# Patient Record
Sex: MALE | Race: WHITE | NOT HISPANIC OR LATINO
[De-identification: names, ages, dates, MRNs, and addresses within clinical notes are randomized per-mention and may not be internally consistent; named-entity substitution may affect disease eponyms.]

---

## 2019-02-28 PROBLEM — E78.5 HYPERLIPIDEMIA: Status: ACTIVE | Noted: 2019-02-28

## 2019-02-28 PROBLEM — I10 HYPERTENSION: Status: ACTIVE | Noted: 2019-02-28

## 2019-02-28 RX ORDER — APIXABAN 5 MG/1
5 TABLET, FILM COATED ORAL
Qty: 60 | Refills: 5 | Status: ACTIVE | COMMUNITY

## 2019-03-01 ENCOUNTER — NON-APPOINTMENT (OUTPATIENT)
Age: 74
End: 2019-03-01

## 2019-03-01 ENCOUNTER — APPOINTMENT (OUTPATIENT)
Dept: ELECTROPHYSIOLOGY | Facility: CLINIC | Age: 74
End: 2019-03-01
Payer: MEDICARE

## 2019-03-01 VITALS
DIASTOLIC BLOOD PRESSURE: 93 MMHG | BODY MASS INDEX: 25.13 KG/M2 | OXYGEN SATURATION: 100 % | WEIGHT: 162 LBS | SYSTOLIC BLOOD PRESSURE: 144 MMHG | HEART RATE: 116 BPM | HEIGHT: 67.5 IN

## 2019-03-01 DIAGNOSIS — E78.5 HYPERLIPIDEMIA, UNSPECIFIED: ICD-10-CM

## 2019-03-01 DIAGNOSIS — I10 ESSENTIAL (PRIMARY) HYPERTENSION: ICD-10-CM

## 2019-03-01 PROCEDURE — 99205 OFFICE O/P NEW HI 60 MIN: CPT

## 2019-03-01 PROCEDURE — 93000 ELECTROCARDIOGRAM COMPLETE: CPT

## 2019-03-01 RX ORDER — LATANOPROST/PF 0.005 %
0.01 DROPS OPHTHALMIC (EYE)
Refills: 0 | Status: ACTIVE | COMMUNITY
Start: 2019-03-01

## 2019-03-01 NOTE — PHYSICAL EXAM
[General Appearance - Well Developed] : well developed [Normal Appearance] : normal appearance [Well Groomed] : well groomed [General Appearance - Well Nourished] : well nourished [No Deformities] : no deformities [General Appearance - In No Acute Distress] : no acute distress [Normal Conjunctiva] : the conjunctiva exhibited no abnormalities [Eyelids - No Xanthelasma] : the eyelids demonstrated no xanthelasmas [Normal Oral Mucosa] : normal oral mucosa [No Oral Pallor] : no oral pallor [No Oral Cyanosis] : no oral cyanosis [Normal Jugular Venous A Waves Present] : normal jugular venous A waves present [Normal Jugular Venous V Waves Present] : normal jugular venous V waves present [No Jugular Venous Lopez A Waves] : no jugular venous lopez A waves [Heart Sounds] : normal S1 and S2 [Murmurs] : no murmurs present [Irregularly Irregular] : the rhythm was irregularly irregular [Respiration, Rhythm And Depth] : normal respiratory rhythm and effort [Exaggerated Use Of Accessory Muscles For Inspiration] : no accessory muscle use [Auscultation Breath Sounds / Voice Sounds] : lungs were clear to auscultation bilaterally [Abdomen Soft] : soft [Abdomen Tenderness] : non-tender [Abdomen Mass (___ Cm)] : no abdominal mass palpated [Abnormal Walk] : normal gait [Gait - Sufficient For Exercise Testing] : the gait was sufficient for exercise testing [Nail Clubbing] : no clubbing of the fingernails [Cyanosis, Localized] : no localized cyanosis [Petechial Hemorrhages (___cm)] : no petechial hemorrhages [Skin Color & Pigmentation] : normal skin color and pigmentation [] : no rash [No Venous Stasis] : no venous stasis [Skin Lesions] : no skin lesions [No Skin Ulcers] : no skin ulcer [No Xanthoma] : no  xanthoma was observed [Oriented To Time, Place, And Person] : oriented to person, place, and time [Affect] : the affect was normal [Mood] : the mood was normal [No Anxiety] : not feeling anxious [FreeTextEntry1] : + edema

## 2019-03-01 NOTE — REASON FOR VISIT
[Consultation] : a consultation regarding [Atrial Fibrillation] : atrial fibrillation [Spouse] : spouse [FreeTextEntry1] : atrial flutter

## 2019-03-01 NOTE — DISCUSSION/SUMMARY
[FreeTextEntry1] : 73 year old man with paroxysmal atrial tachyarrhythmias (fibrillation and flutter) in the setting of hypertension.  We discussed options for therapy.  Specifically we discussed the options of rate versus rhythm control.  Although he does function at a high level he may have symptoms, ie decreased exercise tolerance and lethargy, that may be attributed to the arrhythmia.  While amiodarone would be inappropriate long term therapy it may be reasonable to try this medication to prove or disprove this hypothesis.  We also discussed a brief hospitalization for initiation of sotalol in lieu of metoprolol.  We spent a great deal of time discussing the role of ablation.  Should we proceed in this direction, prior to moving catheters in his LA I would get a preprocedure NIKI.  For now we will increase his AV lou blockade, ie add Cardizem.  He will see how he feels with this intervention while he considers the options for rhythm control.  He understands that regardless of the strategy he will need lifelong TE prophylaxis.

## 2019-03-01 NOTE — HISTORY OF PRESENT ILLNESS
[FreeTextEntry1] : 73 year old man with a history of hypertension went for a routine visit to his PMD last month and was found to be in atrial fibrillation.  He does admit to occasional lightheadedness, but no syncope collapse.  No other complaints: no palpitations, no chest pain. He has had intermittent mild shortness of breath, but he has attributed this to deconditioning. He is active.  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  He had been riding about 20 miles for 1.5 hours.  He used to be more aggressive and would even ride in temperatures < 20 degrees.  He does do some calisthenics in the off season. He admits that over the past few years he has been cutting back on his work out regimen.  On finding the arrhythmia in January he was started on Elquis.  He subsequently underwent a stress test, an echo, and an holter.  He remarks that when he showed up for the stress test he was with a resting HR of 145 bpm. and reached 190 bpm during the test.  Of note he did not take the metoprolol that AM. He was surprised that he was only able to exercise for 5 minutes.  HIs wife also notes that he falls asleep much easier and more often.

## 2019-03-03 ENCOUNTER — TRANSCRIPTION ENCOUNTER (OUTPATIENT)
Age: 74
End: 2019-03-03

## 2019-04-04 ENCOUNTER — NON-APPOINTMENT (OUTPATIENT)
Age: 74
End: 2019-04-04

## 2019-04-04 ENCOUNTER — APPOINTMENT (OUTPATIENT)
Dept: ELECTROPHYSIOLOGY | Facility: CLINIC | Age: 74
End: 2019-04-04
Payer: MEDICARE

## 2019-04-04 VITALS
HEART RATE: 67 BPM | SYSTOLIC BLOOD PRESSURE: 139 MMHG | WEIGHT: 162 LBS | OXYGEN SATURATION: 100 % | HEIGHT: 67 IN | BODY MASS INDEX: 25.43 KG/M2 | DIASTOLIC BLOOD PRESSURE: 77 MMHG

## 2019-04-04 PROCEDURE — 99213 OFFICE O/P EST LOW 20 MIN: CPT

## 2019-04-04 PROCEDURE — 93000 ELECTROCARDIOGRAM COMPLETE: CPT

## 2019-04-04 NOTE — DISCUSSION/SUMMARY
[FreeTextEntry1] : Doing well from a symptom standpoint.  We again discussed options.  At this time we will do a 30 d monitor to assess burden and rates of breakthrough.  He understands that regardless of the strategy he will require long term TE prophylaxis, ie oral AC.

## 2019-04-04 NOTE — HISTORY OF PRESENT ILLNESS
[FreeTextEntry1] : He has not had any symptoms for the past month.  That is overall he is feeling well.  He denies palpitations. The sensation of "not feeling right" seems to have gone as well. No chest pain. No shortness of breath.

## 2019-04-04 NOTE — PHYSICAL EXAM
[General Appearance - Well Developed] : well developed [Normal Appearance] : normal appearance [Well Groomed] : well groomed [General Appearance - Well Nourished] : well nourished [No Deformities] : no deformities [General Appearance - In No Acute Distress] : no acute distress [Normal Conjunctiva] : the conjunctiva exhibited no abnormalities [Eyelids - No Xanthelasma] : the eyelids demonstrated no xanthelasmas [Normal Oral Mucosa] : normal oral mucosa [No Oral Pallor] : no oral pallor [No Oral Cyanosis] : no oral cyanosis [Normal Jugular Venous A Waves Present] : normal jugular venous A waves present [Normal Jugular Venous V Waves Present] : normal jugular venous V waves present [No Jugular Venous Lopez A Waves] : no jugular venous lopez A waves [Respiration, Rhythm And Depth] : normal respiratory rhythm and effort [Exaggerated Use Of Accessory Muscles For Inspiration] : no accessory muscle use [Auscultation Breath Sounds / Voice Sounds] : lungs were clear to auscultation bilaterally [Heart Sounds] : normal S1 and S2 [Murmurs] : no murmurs present [Abdomen Soft] : soft [Abdomen Tenderness] : non-tender [Abdomen Mass (___ Cm)] : no abdominal mass palpated [Abnormal Walk] : normal gait [Gait - Sufficient For Exercise Testing] : the gait was sufficient for exercise testing [Nail Clubbing] : no clubbing of the fingernails [Cyanosis, Localized] : no localized cyanosis [Petechial Hemorrhages (___cm)] : no petechial hemorrhages [Skin Color & Pigmentation] : normal skin color and pigmentation [] : no rash [No Venous Stasis] : no venous stasis [Skin Lesions] : no skin lesions [No Skin Ulcers] : no skin ulcer [No Xanthoma] : no  xanthoma was observed [Oriented To Time, Place, And Person] : oriented to person, place, and time [Affect] : the affect was normal [Mood] : the mood was normal [No Anxiety] : not feeling anxious [Normal] : the heart rate was normal [Regular] : the rhythm was regular [FreeTextEntry1] : + edema

## 2019-05-19 ENCOUNTER — FORM ENCOUNTER (OUTPATIENT)
Age: 74
End: 2019-05-19

## 2019-06-09 ENCOUNTER — TRANSCRIPTION ENCOUNTER (OUTPATIENT)
Age: 74
End: 2019-06-09

## 2019-08-27 ENCOUNTER — OUTPATIENT (OUTPATIENT)
Dept: OUTPATIENT SERVICES | Facility: HOSPITAL | Age: 74
LOS: 1 days | End: 2019-08-27
Payer: MEDICARE

## 2019-08-27 ENCOUNTER — APPOINTMENT (OUTPATIENT)
Dept: CV DIAGNOSITCS | Facility: HOSPITAL | Age: 74
End: 2019-08-27

## 2019-08-27 VITALS
WEIGHT: 160.06 LBS | TEMPERATURE: 98 F | RESPIRATION RATE: 18 BRPM | OXYGEN SATURATION: 98 % | SYSTOLIC BLOOD PRESSURE: 130 MMHG | HEART RATE: 112 BPM | DIASTOLIC BLOOD PRESSURE: 90 MMHG | HEIGHT: 67 IN

## 2019-08-27 DIAGNOSIS — I48.91 UNSPECIFIED ATRIAL FIBRILLATION: ICD-10-CM

## 2019-08-27 DIAGNOSIS — Z01.818 ENCOUNTER FOR OTHER PREPROCEDURAL EXAMINATION: ICD-10-CM

## 2019-08-27 DIAGNOSIS — S83.289A OTHER TEAR OF LATERAL MENISCUS, CURRENT INJURY, UNSPECIFIED KNEE, INITIAL ENCOUNTER: Chronic | ICD-10-CM

## 2019-08-27 LAB
ANION GAP SERPL CALC-SCNC: 15 MMOL/L — SIGNIFICANT CHANGE UP (ref 5–17)
APTT BLD: 32.3 SEC — SIGNIFICANT CHANGE UP (ref 27.5–36.3)
BLD GP AB SCN SERPL QL: NEGATIVE — SIGNIFICANT CHANGE UP
BUN SERPL-MCNC: 14 MG/DL — SIGNIFICANT CHANGE UP (ref 7–23)
CALCIUM SERPL-MCNC: 10.2 MG/DL — SIGNIFICANT CHANGE UP (ref 8.4–10.5)
CHLORIDE SERPL-SCNC: 100 MMOL/L — SIGNIFICANT CHANGE UP (ref 96–108)
CO2 SERPL-SCNC: 24 MMOL/L — SIGNIFICANT CHANGE UP (ref 22–31)
CREAT SERPL-MCNC: 0.92 MG/DL — SIGNIFICANT CHANGE UP (ref 0.5–1.3)
GLUCOSE SERPL-MCNC: 112 MG/DL — HIGH (ref 70–99)
HCT VFR BLD CALC: 43.7 % — SIGNIFICANT CHANGE UP (ref 39–50)
HGB BLD-MCNC: 14.4 G/DL — SIGNIFICANT CHANGE UP (ref 13–17)
INR BLD: 1.19 RATIO — HIGH (ref 0.88–1.16)
MCHC RBC-ENTMCNC: 33 GM/DL — SIGNIFICANT CHANGE UP (ref 32–36)
MCHC RBC-ENTMCNC: 33 PG — SIGNIFICANT CHANGE UP (ref 27–34)
MCV RBC AUTO: 100 FL — SIGNIFICANT CHANGE UP (ref 80–100)
PLATELET # BLD AUTO: 165 K/UL — SIGNIFICANT CHANGE UP (ref 150–400)
POTASSIUM SERPL-MCNC: 4.3 MMOL/L — SIGNIFICANT CHANGE UP (ref 3.5–5.3)
POTASSIUM SERPL-SCNC: 4.3 MMOL/L — SIGNIFICANT CHANGE UP (ref 3.5–5.3)
PROTHROM AB SERPL-ACNC: 13.6 SEC — HIGH (ref 10–12.9)
RBC # BLD: 4.37 M/UL — SIGNIFICANT CHANGE UP (ref 4.2–5.8)
RBC # FLD: 12.6 % — SIGNIFICANT CHANGE UP (ref 10.3–14.5)
RH IG SCN BLD-IMP: POSITIVE — SIGNIFICANT CHANGE UP
SODIUM SERPL-SCNC: 139 MMOL/L — SIGNIFICANT CHANGE UP (ref 135–145)
WBC # BLD: 4 K/UL — SIGNIFICANT CHANGE UP (ref 3.8–10.5)
WBC # FLD AUTO: 4 K/UL — SIGNIFICANT CHANGE UP (ref 3.8–10.5)

## 2019-08-27 PROCEDURE — 93312 ECHO TRANSESOPHAGEAL: CPT | Mod: 26

## 2019-08-27 PROCEDURE — 93010 ELECTROCARDIOGRAM REPORT: CPT

## 2019-08-27 PROCEDURE — 93306 TTE W/DOPPLER COMPLETE: CPT | Mod: 26

## 2019-08-27 RX ORDER — APIXABAN 2.5 MG/1
1 TABLET, FILM COATED ORAL
Qty: 0 | Refills: 0 | DISCHARGE

## 2019-08-27 NOTE — H&P CARDIOLOGY - HISTORY OF PRESENT ILLNESS
This is a 74 yo male [facility] HTN, former smoker who presented to [facility] in January for routine examination and was found to be in afib and was started on Eliquis last dose 8/27/19.  Pt. endorses episodes of lightheadedness and SOB. 2/6/19 Holter monitor revealed persistent atrial fib/flutter.  Denies chest pain/pressure, dizziness, diaphoresis, nausea, vomiting, peripheral edema, recent weight gain, or syncope.  No implanted monitoring devices.  Pt. presents for PST/NIKI to proceed with ablation 8/28/19.

## 2019-08-28 ENCOUNTER — OUTPATIENT (OUTPATIENT)
Dept: INPATIENT UNIT | Facility: HOSPITAL | Age: 74
LOS: 1 days | End: 2019-08-28
Payer: MEDICARE

## 2019-08-28 VITALS
RESPIRATION RATE: 17 BRPM | SYSTOLIC BLOOD PRESSURE: 120 MMHG | DIASTOLIC BLOOD PRESSURE: 80 MMHG | WEIGHT: 160.06 LBS | OXYGEN SATURATION: 97 % | HEIGHT: 68 IN | HEART RATE: 94 BPM | TEMPERATURE: 98 F

## 2019-08-28 DIAGNOSIS — I48.91 UNSPECIFIED ATRIAL FIBRILLATION: ICD-10-CM

## 2019-08-28 DIAGNOSIS — S83.289A OTHER TEAR OF LATERAL MENISCUS, CURRENT INJURY, UNSPECIFIED KNEE, INITIAL ENCOUNTER: Chronic | ICD-10-CM

## 2019-08-28 LAB
BLD GP AB SCN SERPL QL: NEGATIVE — SIGNIFICANT CHANGE UP
RH IG SCN BLD-IMP: POSITIVE — SIGNIFICANT CHANGE UP

## 2019-08-28 RX ORDER — PANTOPRAZOLE SODIUM 20 MG/1
40 TABLET, DELAYED RELEASE ORAL DAILY
Refills: 0 | Status: DISCONTINUED | OUTPATIENT
Start: 2019-08-28 | End: 2019-08-30

## 2019-08-28 RX ORDER — METOPROLOL TARTRATE 50 MG
100 TABLET ORAL DAILY
Refills: 0 | Status: DISCONTINUED | OUTPATIENT
Start: 2019-08-28 | End: 2019-08-29

## 2019-08-28 RX ORDER — DILTIAZEM HCL 120 MG
180 CAPSULE, EXT RELEASE 24 HR ORAL DAILY
Refills: 0 | Status: DISCONTINUED | OUTPATIENT
Start: 2019-08-28 | End: 2019-08-29

## 2019-08-28 RX ORDER — APIXABAN 2.5 MG/1
5 TABLET, FILM COATED ORAL
Refills: 0 | Status: DISCONTINUED | OUTPATIENT
Start: 2019-08-28 | End: 2019-08-30

## 2019-08-28 RX ADMIN — APIXABAN 5 MILLIGRAM(S): 2.5 TABLET, FILM COATED ORAL at 14:36

## 2019-08-28 RX ADMIN — PANTOPRAZOLE SODIUM 40 MILLIGRAM(S): 20 TABLET, DELAYED RELEASE ORAL at 14:36

## 2019-08-28 NOTE — CHART NOTE - NSCHARTNOTEFT_GEN_A_CORE
Removal of Femoral Suture    Pulses in the right lower extremity are palpable.   The patient was placed in the supine position. The insertion site was identified and the sutures were removed per protocol.    The suture with stopcock removed.   Direct pressure was applied for  __10___ minutes.   Complications: None, VSS, Good Hemostasis.     Monitoring of the right groin and lower extremity including neuro-vascular checks and vital signs every 15 minutes x 4, then every 30 minutes x 2, then every 1 hour was ordered.    Discharge Instruction discussed with patient: ASA, Eliquis, diet, activities, access site care, follow up care, reportable signs and symptoms.     A/P   74 yo male [facility] HTN, former smoker who presented to PMD in January for routine examination and was found to be in afib and was started on Eliquis last dose 8/27/19.  Pt. endorses episodes of lightheadedness and SOB. 2/6/19 Holter monitor revealed persistent atrial fib/flutter.  Denies chest pain/pressure, dizziness, diaphoresis, nausea, vomiting, peripheral edema, recent weight gain, or syncope.  No implanted monitoring devices.  Pt s/p AF/AFL ablation via RFV with no site complications    Plan: continue to monitor, Continue Eliquis received dose at 2pm will receive at [facility] then Cont 10a&10P tomorrow  Cont Protonix for 1 month  Cont Soft mech diet for 1 month   discharge home in am if stable.  Pt will follow up with Dr. Davidson as scheduled and your cardiologist in 1-2weeks.    Brunilda Dawson, [facility]  Cardiology

## 2019-08-28 NOTE — PROGRESS NOTE ADULT - SUBJECTIVE AND OBJECTIVE BOX
Pre-op Diagnosis: PAF, AFL    Post-op Diagnosis: same    Procedure: EPS/Ablation    Electrophysiologist: Stuart    Anesthesia: Dr. Santiago    Access: RFV (sonosite guided)    Description:  The patient presented to the EP laboratory in atrial flutter.  Surface F wave and intracardiac activation were consistent with typical counter clockwise atrial flutter and entrainment mappng confirmed the rhythm to be dependant on the CTI.  A linear series of lesions was placed from the TV to the IVC.  Fluter CL increased and the rhythm terminated at the isthmus with RF.  Rate independent bidirectional block was achieved.  With the aid of intracardiac echo and fluoroscopy transeptal puncture was performed (mean LA= 15 mmHg). A 3D electroanatomic shell of the LA was created with Carto 3.  Empiric isolation was performed in sinus rhythm. First pass isolation was achieved on both sides.  There was automaticity of both left and right veins post isolation.  Adenosine failed to promote reconnection. Voltage mapping showed normal extravenous voltage. PVs remained isolated through a > 30 min waiting period and CTI block was present for > 1 hour.      Complications: none    EBL: < 50 cc    Disposition: CSSU     Plan: Continue with Eliquis (today at 2 pm)          Remove suture at 6 pm

## 2019-08-28 NOTE — CHART NOTE - NSCHARTNOTEFT_GEN_A_CORE
Patient s/p AFib/Flutter ablation, will receive Eliquis at 1400 and midnight tonight and resume 10a and 10p on 8/29, discussed with Dr Davidson.    Elissa Bentley, NP  [id number]

## 2019-08-29 ENCOUNTER — TRANSCRIPTION ENCOUNTER (OUTPATIENT)
Age: 74
End: 2019-08-29

## 2019-08-29 LAB
ANION GAP SERPL CALC-SCNC: 11 MMOL/L — SIGNIFICANT CHANGE UP (ref 5–17)
BUN SERPL-MCNC: 15 MG/DL — SIGNIFICANT CHANGE UP (ref 7–23)
CALCIUM SERPL-MCNC: 9.4 MG/DL — SIGNIFICANT CHANGE UP (ref 8.4–10.5)
CHLORIDE SERPL-SCNC: 106 MMOL/L — SIGNIFICANT CHANGE UP (ref 96–108)
CO2 SERPL-SCNC: 23 MMOL/L — SIGNIFICANT CHANGE UP (ref 22–31)
CREAT SERPL-MCNC: 0.92 MG/DL — SIGNIFICANT CHANGE UP (ref 0.5–1.3)
GLUCOSE SERPL-MCNC: 155 MG/DL — HIGH (ref 70–99)
HCT VFR BLD CALC: 39.9 % — SIGNIFICANT CHANGE UP (ref 39–50)
HGB BLD-MCNC: 13.6 G/DL — SIGNIFICANT CHANGE UP (ref 13–17)
MAGNESIUM SERPL-MCNC: 2.1 MG/DL — SIGNIFICANT CHANGE UP (ref 1.6–2.6)
MCHC RBC-ENTMCNC: 33.9 PG — SIGNIFICANT CHANGE UP (ref 27–34)
MCHC RBC-ENTMCNC: 34 GM/DL — SIGNIFICANT CHANGE UP (ref 32–36)
MCV RBC AUTO: 99.8 FL — SIGNIFICANT CHANGE UP (ref 80–100)
PLATELET # BLD AUTO: 153 K/UL — SIGNIFICANT CHANGE UP (ref 150–400)
POTASSIUM SERPL-MCNC: 4.7 MMOL/L — SIGNIFICANT CHANGE UP (ref 3.5–5.3)
POTASSIUM SERPL-SCNC: 4.7 MMOL/L — SIGNIFICANT CHANGE UP (ref 3.5–5.3)
RBC # BLD: 4 M/UL — LOW (ref 4.2–5.8)
RBC # FLD: 13 % — SIGNIFICANT CHANGE UP (ref 10.3–14.5)
SODIUM SERPL-SCNC: 140 MMOL/L — SIGNIFICANT CHANGE UP (ref 135–145)
WBC # BLD: 4.9 K/UL — SIGNIFICANT CHANGE UP (ref 3.8–10.5)
WBC # FLD AUTO: 4.9 K/UL — SIGNIFICANT CHANGE UP (ref 3.8–10.5)

## 2019-08-29 PROCEDURE — 93010 ELECTROCARDIOGRAM REPORT: CPT

## 2019-08-29 PROCEDURE — 93010 ELECTROCARDIOGRAM REPORT: CPT | Mod: 76

## 2019-08-29 RX ORDER — SOTALOL HCL 120 MG
80 TABLET ORAL EVERY 12 HOURS
Refills: 0 | Status: DISCONTINUED | OUTPATIENT
Start: 2019-08-29 | End: 2019-08-29

## 2019-08-29 RX ORDER — SODIUM CHLORIDE 9 MG/ML
250 INJECTION INTRAMUSCULAR; INTRAVENOUS; SUBCUTANEOUS ONCE
Refills: 0 | Status: DISCONTINUED | OUTPATIENT
Start: 2019-08-29 | End: 2019-08-29

## 2019-08-29 RX ORDER — PANTOPRAZOLE SODIUM 20 MG/1
1 TABLET, DELAYED RELEASE ORAL
Qty: 30 | Refills: 0
Start: 2019-08-29 | End: 2019-09-27

## 2019-08-29 RX ORDER — SOTALOL HCL 120 MG
80 TABLET ORAL ONCE
Refills: 0 | Status: COMPLETED | OUTPATIENT
Start: 2019-08-29 | End: 2019-08-29

## 2019-08-29 RX ORDER — SOTALOL HCL 120 MG
80 TABLET ORAL EVERY 12 HOURS
Refills: 0 | Status: DISCONTINUED | OUTPATIENT
Start: 2019-08-29 | End: 2019-08-30

## 2019-08-29 RX ADMIN — APIXABAN 5 MILLIGRAM(S): 2.5 TABLET, FILM COATED ORAL at 10:31

## 2019-08-29 RX ADMIN — Medication 80 MILLIGRAM(S): at 09:20

## 2019-08-29 RX ADMIN — Medication 180 MILLIGRAM(S): at 05:35

## 2019-08-29 RX ADMIN — APIXABAN 5 MILLIGRAM(S): 2.5 TABLET, FILM COATED ORAL at 21:20

## 2019-08-29 RX ADMIN — APIXABAN 5 MILLIGRAM(S): 2.5 TABLET, FILM COATED ORAL at 00:36

## 2019-08-29 RX ADMIN — PANTOPRAZOLE SODIUM 40 MILLIGRAM(S): 20 TABLET, DELAYED RELEASE ORAL at 05:35

## 2019-08-29 RX ADMIN — Medication 80 MILLIGRAM(S): at 21:20

## 2019-08-29 RX ADMIN — Medication 100 MILLIGRAM(S): at 05:35

## 2019-08-29 NOTE — DISCHARGE NOTE PROVIDER - NSDCFUADDINST_GEN_ALL_CORE_FT
No heavy lifting, strenuous activity, bending, straining, or unnecessary stair climbing for 2 weeks. No driving for 2 days. You may shower 24 hours following the procedure but avoid baths/swimming for 1 week. Check your groin site for bleeding and/or swelling daily following procedure and call your doctor immediately if it occurs or if you experience increased pain at the site. Follow up with your cardiologist in 1-2 weeks. You may call [facility]  if you have any questions/concerns regarding your procedure [phone]. No heavy lifting, strenuous activity, bending, straining, or unnecessary stair climbing for 2 weeks. No driving for 2 days. You may shower 24 hours following the procedure but avoid baths/swimming for 1 week. Check your groin site for bleeding and/or swelling daily following procedure and call your doctor immediately if it occurs or if you experience increased pain at the site. Follow up with your cardiologist in 1-2 weeks. You may call [facility]  if you have any questions/concerns regarding your procedure [phone].  Continue taking Sotalol as prescribed.

## 2019-08-29 NOTE — DISCHARGE NOTE PROVIDER - HOSPITAL COURSE
This is a 74 yo male [facility] HTN, former smoker who presented to [facility] in January for routine examination and was found to be in afib and was started on Eliquis last dose 8/27/19.  Pt. endorses episodes of lightheadedness and SOB. 2/6/19 Holter monitor revealed persistent atrial fib/flutter.  Denies chest pain/pressure, dizziness, diaphoresis, nausea, vomiting, peripheral edema, recent weight gain, or syncope.  No implanted monitoring devices.         Pt s/p AF/AFL ablation via RFV with no site complications or tele events.  Pt remains in NSR 80-90s overnight.  Pt tolerated procedure well with no post ablation complications and hospitalization remained uneventful. Pt stable for discharge home activity and restrictions, diet, medication and diet reviewed with pt and verbalized good understanding via teachback method. This is a 74 yo male [facility] HTN, former smoker who presented to [facility] in January for routine examination and was found to be in afib and was started on Eliquis last dose 8/27/19.  Pt. endorses episodes of lightheadedness and SOB. 2/6/19 Holter monitor revealed persistent atrial fib/flutter.  Denies chest pain/pressure, dizziness, diaphoresis, nausea, vomiting, peripheral edema, recent weight gain, or syncope.  No implanted monitoring devices.         Pt s/p AF/AFL ablation via RFV with no site complications or tele events.  Pt remains in NSR 80-90s overnight.  Pt tolerated procedure well with no post ablation complications and hospitalization remained uneventful. Pt stable for discharge home activity and restrictions, diet, medication and diet reviewed with pt and verbalized good understanding via teachback method.          8/29 Started on Sotalol 80 mg PO Q12h, QTc 466 ms.

## 2019-08-29 NOTE — DISCHARGE NOTE PROVIDER - NSDCPNSUBOBJ_GEN_ALL_CORE
Patient is a 73y old  Male who presents with a chief complaint of Atrial Fibrillation/Atrial Flutter (29 Aug 2019 07:44)                    Allergies        No Known Allergies        Intolerances                Medications:    apixaban 5 milliGRAM(s) Oral two times a day    pantoprazole    Tablet 40 milliGRAM(s) Oral daily    sotalol 80 milliGRAM(s) Oral every 12 hours            Vitals:    T(C): 36.5 (08-30-19 @ 12:10), Max: 36.6 (08-29-19 @ 20:26)    HR: 78 (08-30-19 @ 12:10) (72 - 78)    BP: 120/77 (08-30-19 @ 12:10) (115/86 - 120/77)    BP(mean): --    RR: 17 (08-30-19 @ 12:10) (16 - 17)    SpO2: 96% (08-30-19 @ 12:10) (95% - 96%)    Wt(kg): --    Daily       Daily     I&O's Summary        29 Aug 2019 07:01  -  30 Aug 2019 07:00    --------------------------------------------------------    IN: 720 mL / OUT: 300 mL / NET: 420 mL        30 Aug 2019 07:01  -  30 Aug 2019 19:19    --------------------------------------------------------    IN: 420 mL / OUT: 0 mL / NET: 420 mL                    Physical Exam:    Appearance: Normal    Eyes: PERRL, EOMI    HENT: Normal oral muscosa, NC/AT    Cardiovascular: S1S2, RRR, No M/R/G, no JVD, No Lower extremity edema    Procedural Access Site: No hematoma, Non-tender to palpation, 2+ pulse, No bruit, No Ecchymosis    Respiratory: Clear to auscultation bilaterally    Gastrointestinal: Soft, Non tender, Normal Bowel Sounds    Musculoskeletal: No clubbing, No joint deformity     Neurologic: Non-focal    Lymphatic: No lymphadenopathy    Psychiatry: AAOx3, Mood & affect appropriate    Skin: No rashes, No ecchymoses, No cyanosis        08-30        137  |  102  |  20    ----------------------------<  123<H>    4.4   |  25  |  1.00        Ca    9.2      30 Aug 2019 00:18    Mg     2.1     08-29                            Lipid panel     Hgb A1c                             12.0     6.4   )-----------( 136      ( 30 Aug 2019 00:18 )               35.2                 ECG: SR 86 bpm        Electrophysiology: Aflutter ablation        Imaging:        Interpretation of Telemetry:            ATRIAL FLUTTER ABLATION    Continue Sotalol as prescribed    Monitor groin site for swelling, bleeding

## 2019-08-29 NOTE — CHART NOTE - NSCHARTNOTEFT_GEN_A_CORE
Call from Tele: Pt converted to Atrial fibrillation    Pt s/p AFIB/AFL ablation on 8/28/19 was in the process of being d/c home when arrhythmia was discovered.  Pt asymptomatic.    Plan:  Sotalol 80mg x1 given with repeat ECG done with QTc acceptable parameters.  R/W Dr. Davidson  Continue Sotalol 80mg BID ECG 2 hours post to check QTc   D/C Metoprolol  Continue Eliquis  NPO after Midnight  Dr. Davidson and myself discussed plan with pt  Cont tele    LONNY Bowen-BC  Cardiology

## 2019-08-29 NOTE — DISCHARGE NOTE PROVIDER - NSDCACTIVITY_GEN_ALL_CORE
Walking - Indoors allowed/Walking - Outdoors allowed/Showering allowed Walking - Indoors allowed/Walking - Outdoors allowed/No heavy lifting/straining/Showering allowed

## 2019-08-29 NOTE — CHART NOTE - NSCHARTNOTEFT_GEN_A_CORE
Dizziness when ambulating.  Tele HR high 50s-70s with 2.1s pause AF    Plan:  Maintain bedrest  followup orthostatic BP/HR  continue Sotalol  cont to hold BB and CCB will reevaluate in am  cont tele  Stat Echo to r/o Pericardial Effusion  Cont Eliquis  NPO after MN  Discussed plan with Dr. Stuart Dawson, [facility]  Cardiology Dizziness when ambulating.  Tele HR high 50s-70s with 2.1s pause AF    Plan:  Maintain bedrest  followup orthostatic BP/HR  continue Sotalol  cont to hold BB and CCB will reevaluate in am  cont tele  Stat Echo to r/o Pericardial Effusion  Cont Eliquis  NPO after MN  Discussed plan with Dr. Stuart Dawson, [facility]  Cardiology    Addendum:  Preliminary Echo results negative for pericardial effusion  Negative orthostatic BP/HR BP soft 95/79  ECG Afib @66  Will give NS bolus 250cc over 20min    Brunilda Dawson, [facility]  Cardiology Dizziness when ambulating.  Tele HR high 50s-70s with 2.1s pause AF    Plan:  Maintain bedrest  followup orthostatic BP/HR  continue Sotalol  cont to hold BB and CCB will reevaluate in am  cont tele  Stat Echo to r/o Pericardial Effusion  Cont Eliquis  NPO after MN  Discussed plan with Dr. Stuart Dawson, [facility]  Cardiology    Addendum:  Preliminary Echo results negative for pericardial effusion  Negative orthostatic BP/HR BP soft 95/79  ECG Afib @66    Brunilda Dawson [facility]  Cardiology

## 2019-08-29 NOTE — DISCHARGE NOTE PROVIDER - NSDCFUADDAPPT_GEN_ALL_CORE_FT
Follow up with Dr. Davidson on 10/10/19 at 8:20am Follow up with Dr. Davidson on Thursday, October 10th at 8:20 am.

## 2019-08-29 NOTE — DISCHARGE NOTE PROVIDER - NSDCFUSCHEDAPPT_GEN_ALL_CORE_FT
YOLANDA ASCENCIO ; 10/10/2019 ; [facility]  YOLNADA ASCENCIO ; 10/10/2019 ; [facility]

## 2019-08-29 NOTE — CHART NOTE - NSCHARTNOTEFT_GEN_A_CORE
Tele Event:    Pt had 2.05s conversion pause at 1647 and NSR at 1655 60s and SBP 120s    Pt states feeling well    Plan as d/w Dr. Davidson  Cont tele monitoring overnight  ECG NSR @67 QTc 400  Cont Sotalol f/u with ECG to check QTc 2 hours post dose  Cont NPO for now  No DCCV if pt remains in SR  D/W pt and family the plan    Brunilda Dawson, JIMBOCNP-BC  Cardiology

## 2019-08-29 NOTE — CHART NOTE - NSCHARTNOTEFT_GEN_A_CORE
Patient is a 73y old male currently resting comfortably, offers no complaints.         Allergies    No Known Allergies    Intolerances        Medications:  apixaban 5 milliGRAM(s) Oral two times a day  diltiazem    milliGRAM(s) Oral daily  metoprolol succinate  milliGRAM(s) Oral daily  pantoprazole    Tablet 40 milliGRAM(s) Oral daily      Vitals:  T(C): 37.1 (19 @ 20:40), Max: 37.1 (19 @ 20:40)  HR: 93 (19 @ 21:40) (93 - 99)  BP: 129/88 (19 @ 21:40) (96/76 - 133/91)  BP(mean): --  RR: 16 (19 @ 21:40) (16 - 17)  SpO2: 98% (19 @ 21:40) (96% - 100%)  Wt(kg): --  Daily Height in cm: 172.72 (28 Aug 2019 13:25)    Daily Weight in k.6 (28 Aug 2019 13:25)  I&O's Summary    28 Aug 2019 07:01  -  29 Aug 2019 00:59  --------------------------------------------------------  IN: 180 mL / OUT: 500 mL / NET: -320 mL          Physical Exam:  Appearance: Normal  Eyes: PERRL, EOMI  HENT: Normal oral muscosa, NC/AT  Cardiovascular: S1S2, RRR, No M/R/G, no JVD, No Lower extremity edema  Procedural Access Site RIGHT FEMORAL VEIN: No hematoma, Non-tender to palpation, 2+ pulse, No bruit, No Ecchymosis  Respiratory: Clear to auscultation bilaterally  Gastrointestinal: Soft, Non tender, Normal Bowel Sounds  Musculoskeletal: No clubbing, No joint deformity   Neurologic: Non-focal  Lymphatic: No lymphadenopathy  Psychiatry: AAOx3, Mood & affect appropriate  Skin: No rashes, No ecchymoses, No cyanosis        139  |  100  |  14  ----------------------------<  112<H>  4.3   |  24  |  0.92    Ca    10.2      27 Aug 2019 09:22      PT/INR - ( 27 Aug 2019 09:22 )   PT: 13.6 sec;   INR: 1.19 ratio         PTT - ( 27 Aug 2019 09:22 )  PTT:32.3 sec    19 PROCEDURE: Atrial flutter ablation/atrial fibrillation      ECG post procedure: SR with APC's      Interpretation of Telemetry: 8 beats of WCT @ 2015 on     A/P  This is a 72 yo male [facility] HTN, former smoker who presented to PMD in January for routine examination and was found to be in afib and was started on Eliquis last dose 19.  Pt. endorses episodes of lightheadedness and SOB. 19 Holter monitor revealed persistent atrial fib/flutter. On  pt underwent successful atrial fib/flutter ablation via right femoral artery access. Pt currently pain free and hemodynamically stable.     #atrial fib/flutter  Eliquis resumed post procedure  continue CCB and beta blocker  PPI for GI/esophageal prophylaxis  monitor groin site    #HTN  continue Cardizem and Metoprolol    #DISPO  anticipate discharge later this am if site/condition remains stable  follow up appointment with Oct 10, 2019 8:20am

## 2019-08-29 NOTE — DISCHARGE NOTE PROVIDER - CARE PROVIDER_API CALL
Jacob Davidson)  Cardiac Electrophysiology; Cardiology  [address] [place] [address]  Phone: [phone]  Fax: [phone]  Follow Up Time: 1 month

## 2019-08-29 NOTE — DISCHARGE NOTE PROVIDER - NSDCCPCAREPLAN_GEN_ALL_CORE_FT
PRINCIPAL DISCHARGE DIAGNOSIS  Diagnosis: Atrial fibrillation and flutter  Assessment and Plan of Treatment: s/p Ablation.  No heavy lifting, strenuous activity, bending, straining, or unnecessary stair climbing for 2 weeks. No driving for 2 days. You may shower 24 hours following the procedure but avoid baths/swimming and sexual activity for 1 week. Check your groin site for bleeding and/or swelling daily following procedure and call your doctor immediately if it occurs or if you experience increased pain at the site. Follow up with your cardiologist in 1-2 weeks. You may call [facility] if you have any questions/concerns regarding your procedure [phone].

## 2019-08-29 NOTE — DISCHARGE NOTE PROVIDER - NSDCCPTREATMENT_GEN_ALL_CORE_FT
PRINCIPAL PROCEDURE  Procedure: Cardiac electrophysiology study with ablation of focus for atrial fibrillation  Findings and Treatment: Atrial fibrillation and Atrial flutter ablation via Right Femoral Vein

## 2019-08-30 ENCOUNTER — TRANSCRIPTION ENCOUNTER (OUTPATIENT)
Age: 74
End: 2019-08-30

## 2019-08-30 VITALS
DIASTOLIC BLOOD PRESSURE: 83 MMHG | TEMPERATURE: 98 F | SYSTOLIC BLOOD PRESSURE: 132 MMHG | HEART RATE: 82 BPM | RESPIRATION RATE: 18 BRPM | OXYGEN SATURATION: 97 %

## 2019-08-30 DIAGNOSIS — I48.91 UNSPECIFIED ATRIAL FIBRILLATION: ICD-10-CM

## 2019-08-30 DIAGNOSIS — I10 ESSENTIAL (PRIMARY) HYPERTENSION: ICD-10-CM

## 2019-08-30 LAB
ANION GAP SERPL CALC-SCNC: 10 MMOL/L — SIGNIFICANT CHANGE UP (ref 5–17)
BASOPHILS # BLD AUTO: 0 K/UL — SIGNIFICANT CHANGE UP (ref 0–0.2)
BASOPHILS NFR BLD AUTO: 0.4 % — SIGNIFICANT CHANGE UP (ref 0–2)
BUN SERPL-MCNC: 20 MG/DL — SIGNIFICANT CHANGE UP (ref 7–23)
CALCIUM SERPL-MCNC: 9.2 MG/DL — SIGNIFICANT CHANGE UP (ref 8.4–10.5)
CHLORIDE SERPL-SCNC: 102 MMOL/L — SIGNIFICANT CHANGE UP (ref 96–108)
CO2 SERPL-SCNC: 25 MMOL/L — SIGNIFICANT CHANGE UP (ref 22–31)
CREAT SERPL-MCNC: 1 MG/DL — SIGNIFICANT CHANGE UP (ref 0.5–1.3)
EOSINOPHIL # BLD AUTO: 0.1 K/UL — SIGNIFICANT CHANGE UP (ref 0–0.5)
EOSINOPHIL NFR BLD AUTO: 1.1 % — SIGNIFICANT CHANGE UP (ref 0–6)
GLUCOSE SERPL-MCNC: 123 MG/DL — HIGH (ref 70–99)
HCT VFR BLD CALC: 35.2 % — LOW (ref 39–50)
HGB BLD-MCNC: 12 G/DL — LOW (ref 13–17)
LYMPHOCYTES # BLD AUTO: 1.1 K/UL — SIGNIFICANT CHANGE UP (ref 1–3.3)
LYMPHOCYTES # BLD AUTO: 16.6 % — SIGNIFICANT CHANGE UP (ref 13–44)
MCHC RBC-ENTMCNC: 34 PG — SIGNIFICANT CHANGE UP (ref 27–34)
MCHC RBC-ENTMCNC: 34.1 GM/DL — SIGNIFICANT CHANGE UP (ref 32–36)
MCV RBC AUTO: 99.5 FL — SIGNIFICANT CHANGE UP (ref 80–100)
MONOCYTES # BLD AUTO: 0.5 K/UL — SIGNIFICANT CHANGE UP (ref 0–0.9)
MONOCYTES NFR BLD AUTO: 7.4 % — SIGNIFICANT CHANGE UP (ref 2–14)
NEUTROPHILS # BLD AUTO: 4.8 K/UL — SIGNIFICANT CHANGE UP (ref 1.8–7.4)
NEUTROPHILS NFR BLD AUTO: 74.5 % — SIGNIFICANT CHANGE UP (ref 43–77)
PLATELET # BLD AUTO: 136 K/UL — LOW (ref 150–400)
POTASSIUM SERPL-MCNC: 4.4 MMOL/L — SIGNIFICANT CHANGE UP (ref 3.5–5.3)
POTASSIUM SERPL-SCNC: 4.4 MMOL/L — SIGNIFICANT CHANGE UP (ref 3.5–5.3)
RBC # BLD: 3.54 M/UL — LOW (ref 4.2–5.8)
RBC # FLD: 12.8 % — SIGNIFICANT CHANGE UP (ref 10.3–14.5)
SODIUM SERPL-SCNC: 137 MMOL/L — SIGNIFICANT CHANGE UP (ref 135–145)
WBC # BLD: 6.4 K/UL — SIGNIFICANT CHANGE UP (ref 3.8–10.5)
WBC # FLD AUTO: 6.4 K/UL — SIGNIFICANT CHANGE UP (ref 3.8–10.5)

## 2019-08-30 PROCEDURE — 86900 BLOOD TYPING SEROLOGIC ABO: CPT

## 2019-08-30 PROCEDURE — 93005 ELECTROCARDIOGRAM TRACING: CPT

## 2019-08-30 PROCEDURE — 85610 PROTHROMBIN TIME: CPT

## 2019-08-30 PROCEDURE — 93010 ELECTROCARDIOGRAM REPORT: CPT

## 2019-08-30 PROCEDURE — 93306 TTE W/DOPPLER COMPLETE: CPT

## 2019-08-30 PROCEDURE — G0463: CPT

## 2019-08-30 PROCEDURE — 85730 THROMBOPLASTIN TIME PARTIAL: CPT

## 2019-08-30 PROCEDURE — 93312 ECHO TRANSESOPHAGEAL: CPT

## 2019-08-30 PROCEDURE — 80048 BASIC METABOLIC PNL TOTAL CA: CPT

## 2019-08-30 PROCEDURE — 86850 RBC ANTIBODY SCREEN: CPT

## 2019-08-30 PROCEDURE — 85027 COMPLETE CBC AUTOMATED: CPT

## 2019-08-30 PROCEDURE — 86901 BLOOD TYPING SEROLOGIC RH(D): CPT

## 2019-08-30 RX ORDER — METOPROLOL TARTRATE 50 MG
0.5 TABLET ORAL
Qty: 0 | Refills: 0 | DISCHARGE

## 2019-08-30 RX ORDER — DILTIAZEM HCL 120 MG
1 CAPSULE, EXT RELEASE 24 HR ORAL
Qty: 0 | Refills: 0 | DISCHARGE

## 2019-08-30 RX ORDER — SOTALOL HCL 120 MG
1 TABLET ORAL
Qty: 60 | Refills: 1
Start: 2019-08-30 | End: 2019-10-28

## 2019-08-30 RX ADMIN — Medication 80 MILLIGRAM(S): at 07:28

## 2019-08-30 RX ADMIN — Medication 80 MILLIGRAM(S): at 17:07

## 2019-08-30 RX ADMIN — PANTOPRAZOLE SODIUM 40 MILLIGRAM(S): 20 TABLET, DELAYED RELEASE ORAL at 07:29

## 2019-08-30 RX ADMIN — APIXABAN 5 MILLIGRAM(S): 2.5 TABLET, FILM COATED ORAL at 07:28

## 2019-08-30 NOTE — PROGRESS NOTE ADULT - SUBJECTIVE AND OBJECTIVE BOX
Patient is a 73y old  Male who presents with a chief complaint of Atrial Fibrillation/Atrial Flutter (29 Aug 2019 07:44)          Allergies    No Known Allergies    Intolerances        Medications:  apixaban 5 milliGRAM(s) Oral two times a day  pantoprazole    Tablet 40 milliGRAM(s) Oral daily  sotalol 80 milliGRAM(s) Oral every 12 hours      Vitals:  T(C): 36.6 (08-29-19 @ 20:26), Max: 36.8 (08-29-19 @ 08:30)  HR: 72 (08-29-19 @ 20:26) (68 - 90)  BP: 118/85 (08-29-19 @ 20:26) (118/85 - 133/93)  BP(mean): --  RR: 16 (08-29-19 @ 20:26) (16 - 17)  SpO2: 95% (08-29-19 @ 20:26) (95% - 97%)  Wt(kg): --  Daily     Daily   I&O's Summary    28 Aug 2019 07:01  -  29 Aug 2019 07:00  --------------------------------------------------------  IN: 300 mL / OUT: 500 mL / NET: -200 mL    29 Aug 2019 07:01  -  30 Aug 2019 04:36  --------------------------------------------------------  IN: 720 mL / OUT: 300 mL / NET: 420 mL          Physical Exam:  Appearance: Normal  Eyes: PERRL, EOMI  HENT: Normal oral muscosa, NC/AT  Cardiovascular: S1S2, RRR, No M/R/G, no JVD, No Lower extremity edema  Procedural Access Site: No hematoma, Non-tender to palpation, 2+ pulse, No bruit, No Ecchymosis  Respiratory: Clear to auscultation bilaterally  Gastrointestinal: Soft, Non tender, Normal Bowel Sounds  Musculoskeletal: No clubbing, No joint deformity   Neurologic: Non-focal  Lymphatic: No lymphadenopathy  Psychiatry: AAOx3, Mood & affect appropriate  Skin: No rashes, No ecchymoses, No cyanosis    08-30    137  |  102  |  20  ----------------------------<  123<H>  4.4   |  25  |  1.00    Ca    9.2      30 Aug 2019 00:18  Mg     2.1     08-29              Lipid panel   Hgb A1c                         12.0   6.4   )-----------( 136      ( 30 Aug 2019 00:18 )             35.2         ECG:    Echo:    Stress Testing:     Cath:    Imaging:    Interpretation of Telemetry: Patient is a 73y old  Male who presents with a chief complaint of Atrial Fibrillation/Atrial Flutter (29 Aug 2019 07:44)          Allergies    No Known Allergies    Intolerances        Medications:  apixaban 5 milliGRAM(s) Oral two times a day  pantoprazole    Tablet 40 milliGRAM(s) Oral daily  sotalol 80 milliGRAM(s) Oral every 12 hours      Vitals:  T(C): 36.6 (08-29-19 @ 20:26), Max: 36.8 (08-29-19 @ 08:30)  HR: 72 (08-29-19 @ 20:26) (68 - 90)  BP: 118/85 (08-29-19 @ 20:26) (118/85 - 133/93)  BP(mean): --  RR: 16 (08-29-19 @ 20:26) (16 - 17)  SpO2: 95% (08-29-19 @ 20:26) (95% - 97%)  Wt(kg): --  Daily     Daily   I&O's Summary    28 Aug 2019 07:01  -  29 Aug 2019 07:00  --------------------------------------------------------  IN: 300 mL / OUT: 500 mL / NET: -200 mL    29 Aug 2019 07:01  -  30 Aug 2019 04:36  --------------------------------------------------------  IN: 720 mL / OUT: 300 mL / NET: 420 mL          Physical Exam:  Appearance: Normal  Eyes: PERRL, EOMI  HENT: Normal oral muscosa, NC/AT  Cardiovascular: S1S2, RRR, No M/R/G, no JVD, No Lower extremity edema  Procedural Access Site: No hematoma, Non-tender to palpation, 2+ pulse, No bruit, No Ecchymosis  Respiratory: Clear to auscultation bilaterally  Gastrointestinal: Soft, Non tender, Normal Bowel Sounds  Musculoskeletal: No clubbing, No joint deformity   Neurologic: Non-focal  Lymphatic: No lymphadenopathy  Psychiatry: AAOx3, Mood & affect appropriate  Skin: No rashes, No ecchymoses, No cyanosis    08-30    137  |  102  |  20  ----------------------------<  123<H>  4.4   |  25  |  1.00    Ca    9.2      30 Aug 2019 00:18  Mg     2.1     08-29              Lipid panel   Hgb A1c                         12.0   6.4   )-----------( 136      ( 30 Aug 2019 00:18 )             35.2         ECG: SR 73 bpm    Electrophysiology: possible cardioversion    Imaging:    Interpretation of Telemetry:

## 2019-08-30 NOTE — DISCHARGE NOTE NURSING/CASE MANAGEMENT/SOCIAL WORK - PATIENT PORTAL LINK FT
You can access the FollowMyHealth Patient Portal offered by [facility] by registering at the following website: [email or web address]. By joining [facility]’s FollowMyHealth portal, you will also be able to view your health information using other applications (apps) compatible with our system.

## 2019-09-03 PROCEDURE — 93656 COMPRE EP EVAL ABLTJ ATR FIB: CPT

## 2019-09-03 PROCEDURE — 80048 BASIC METABOLIC PNL TOTAL CA: CPT

## 2019-09-03 PROCEDURE — C1894: CPT

## 2019-09-03 PROCEDURE — 83735 ASSAY OF MAGNESIUM: CPT

## 2019-09-03 PROCEDURE — 93005 ELECTROCARDIOGRAM TRACING: CPT

## 2019-09-03 PROCEDURE — 93623 PRGRMD STIMJ&PACG IV RX NFS: CPT

## 2019-09-03 PROCEDURE — 93613 INTRACARDIAC EPHYS 3D MAPG: CPT

## 2019-09-03 PROCEDURE — 86850 RBC ANTIBODY SCREEN: CPT

## 2019-09-03 PROCEDURE — 93657 TX L/R ATRIAL FIB ADDL: CPT

## 2019-09-03 PROCEDURE — 93655 ICAR CATH ABLTJ DSCRT ARRHYT: CPT

## 2019-09-03 PROCEDURE — 93662 INTRACARDIAC ECG (ICE): CPT

## 2019-09-03 PROCEDURE — C1759: CPT

## 2019-09-03 PROCEDURE — 86900 BLOOD TYPING SEROLOGIC ABO: CPT

## 2019-09-03 PROCEDURE — 86901 BLOOD TYPING SEROLOGIC RH(D): CPT

## 2019-09-03 PROCEDURE — 93321 DOPPLER ECHO F-UP/LMTD STD: CPT

## 2019-09-03 PROCEDURE — 85027 COMPLETE CBC AUTOMATED: CPT

## 2019-09-03 PROCEDURE — C1731: CPT

## 2019-09-03 PROCEDURE — 93308 TTE F-UP OR LMTD: CPT

## 2019-09-03 PROCEDURE — C1732: CPT

## 2019-09-10 ENCOUNTER — INBOUND DOCUMENT (OUTPATIENT)
Age: 74
End: 2019-09-10

## 2019-09-13 ENCOUNTER — RX RENEWAL (OUTPATIENT)
Age: 74
End: 2019-09-13

## 2019-09-16 RX ORDER — DILTIAZEM HYDROCHLORIDE 180 MG/1
180 CAPSULE, EXTENDED RELEASE ORAL
Qty: 90 | Refills: 1 | Status: DISCONTINUED | COMMUNITY
Start: 2019-03-01 | End: 2019-09-16

## 2019-10-10 ENCOUNTER — NON-APPOINTMENT (OUTPATIENT)
Age: 74
End: 2019-10-10

## 2019-10-10 ENCOUNTER — APPOINTMENT (OUTPATIENT)
Dept: ELECTROPHYSIOLOGY | Facility: CLINIC | Age: 74
End: 2019-10-10
Payer: MEDICARE

## 2019-10-10 VITALS
WEIGHT: 156 LBS | BODY MASS INDEX: 24.48 KG/M2 | DIASTOLIC BLOOD PRESSURE: 86 MMHG | HEART RATE: 71 BPM | HEIGHT: 67 IN | OXYGEN SATURATION: 100 % | SYSTOLIC BLOOD PRESSURE: 164 MMHG

## 2019-10-10 PROBLEM — I10 ESSENTIAL (PRIMARY) HYPERTENSION: Chronic | Status: ACTIVE | Noted: 2019-08-27

## 2019-10-10 PROBLEM — I48.91 UNSPECIFIED ATRIAL FIBRILLATION: Chronic | Status: ACTIVE | Noted: 2019-08-27

## 2019-10-10 PROCEDURE — 93000 ELECTROCARDIOGRAM COMPLETE: CPT

## 2019-10-10 PROCEDURE — 99213 OFFICE O/P EST LOW 20 MIN: CPT

## 2019-10-10 RX ORDER — DILTIAZEM HYDROCHLORIDE 180 MG/1
180 CAPSULE, EXTENDED RELEASE ORAL
Qty: 90 | Refills: 1 | Status: DISCONTINUED | COMMUNITY
Start: 2019-09-13 | End: 2019-10-10

## 2019-10-10 NOTE — PHYSICAL EXAM
[General Appearance - Well Developed] : well developed [Well Groomed] : well groomed [Normal Appearance] : normal appearance [General Appearance - Well Nourished] : well nourished [General Appearance - In No Acute Distress] : no acute distress [No Deformities] : no deformities [Normal Conjunctiva] : the conjunctiva exhibited no abnormalities [Eyelids - No Xanthelasma] : the eyelids demonstrated no xanthelasmas [Normal Oral Mucosa] : normal oral mucosa [No Oral Cyanosis] : no oral cyanosis [No Oral Pallor] : no oral pallor [Normal Jugular Venous A Waves Present] : normal jugular venous A waves present [Normal Jugular Venous V Waves Present] : normal jugular venous V waves present [No Jugular Venous Lopez A Waves] : no jugular venous lopez A waves [Exaggerated Use Of Accessory Muscles For Inspiration] : no accessory muscle use [Respiration, Rhythm And Depth] : normal respiratory rhythm and effort [Auscultation Breath Sounds / Voice Sounds] : lungs were clear to auscultation bilaterally [Heart Rate And Rhythm] : heart rate and rhythm were normal [Heart Sounds] : normal S1 and S2 [Murmurs] : no murmurs present [Abdomen Soft] : soft [Abdomen Tenderness] : non-tender [Abdomen Mass (___ Cm)] : no abdominal mass palpated [Abnormal Walk] : normal gait [Gait - Sufficient For Exercise Testing] : the gait was sufficient for exercise testing [Nail Clubbing] : no clubbing of the fingernails [Cyanosis, Localized] : no localized cyanosis [Petechial Hemorrhages (___cm)] : no petechial hemorrhages [Skin Color & Pigmentation] : normal skin color and pigmentation [No Venous Stasis] : no venous stasis [] : no rash [Skin Lesions] : no skin lesions [No Xanthoma] : no  xanthoma was observed [No Skin Ulcers] : no skin ulcer [Oriented To Time, Place, And Person] : oriented to person, place, and time [Affect] : the affect was normal [Mood] : the mood was normal [No Anxiety] : not feeling anxious

## 2019-10-10 NOTE — HISTORY OF PRESENT ILLNESS
[FreeTextEntry1] : No complaints.  Overall feels well.  He has more energy; not napping or sleeping as much.  The groin is well healed.  No lightheadedness/dizziness.  NO fevers/chills.

## 2019-10-10 NOTE — DISCUSSION/SUMMARY
[FreeTextEntry1] : Doing well post ablation albeit on sotalol.  We discussed that AF early post ablation may be related to inflammation, reconnected vein, or nonpulmonary vein trigger.  We will continue the sotalol (replaced metoprolol).  He will remain on AC long term. Follow up in 6 months.

## 2020-02-10 ENCOUNTER — RX RENEWAL (OUTPATIENT)
Age: 75
End: 2020-02-10

## 2020-02-10 RX ORDER — AMLODIPINE BESYLATE 5 MG/1
5 TABLET ORAL DAILY
Qty: 90 | Refills: 3 | Status: ACTIVE | COMMUNITY
Start: 2019-09-16 | End: 1900-01-01

## 2020-04-16 ENCOUNTER — APPOINTMENT (OUTPATIENT)
Dept: ELECTROPHYSIOLOGY | Facility: CLINIC | Age: 75
End: 2020-04-16

## 2020-06-29 NOTE — PHYSICAL EXAM
[Normal Appearance] : normal appearance [General Appearance - Well Developed] : well developed [No Deformities] : no deformities [General Appearance - Well Nourished] : well nourished [Well Groomed] : well groomed [General Appearance - In No Acute Distress] : no acute distress [Normal Conjunctiva] : the conjunctiva exhibited no abnormalities [Eyelids - No Xanthelasma] : the eyelids demonstrated no xanthelasmas [No Oral Pallor] : no oral pallor [No Oral Cyanosis] : no oral cyanosis [Normal Oral Mucosa] : normal oral mucosa [Normal Jugular Venous A Waves Present] : normal jugular venous A waves present [No Jugular Venous Lopez A Waves] : no jugular venous lopez A waves [Normal Jugular Venous V Waves Present] : normal jugular venous V waves present [Respiration, Rhythm And Depth] : normal respiratory rhythm and effort [Exaggerated Use Of Accessory Muscles For Inspiration] : no accessory muscle use [Heart Sounds] : normal S1 and S2 [Auscultation Breath Sounds / Voice Sounds] : lungs were clear to auscultation bilaterally [Heart Rate And Rhythm] : heart rate and rhythm were normal [Abdomen Tenderness] : non-tender [Abdomen Soft] : soft [Murmurs] : no murmurs present [Abdomen Mass (___ Cm)] : no abdominal mass palpated [Abnormal Walk] : normal gait [Cyanosis, Localized] : no localized cyanosis [Nail Clubbing] : no clubbing of the fingernails [Gait - Sufficient For Exercise Testing] : the gait was sufficient for exercise testing [Petechial Hemorrhages (___cm)] : no petechial hemorrhages [Skin Color & Pigmentation] : normal skin color and pigmentation [] : no rash [No Venous Stasis] : no venous stasis [No Skin Ulcers] : no skin ulcer [No Xanthoma] : no  xanthoma was observed [Skin Lesions] : no skin lesions [Affect] : the affect was normal [Mood] : the mood was normal [Oriented To Time, Place, And Person] : oriented to person, place, and time [No Anxiety] : not feeling anxious

## 2020-06-30 ENCOUNTER — NON-APPOINTMENT (OUTPATIENT)
Age: 75
End: 2020-06-30

## 2020-06-30 ENCOUNTER — APPOINTMENT (OUTPATIENT)
Dept: ELECTROPHYSIOLOGY | Facility: CLINIC | Age: 75
End: 2020-06-30
Payer: MEDICARE

## 2020-06-30 VITALS
HEIGHT: 67 IN | HEART RATE: 69 BPM | WEIGHT: 162 LBS | SYSTOLIC BLOOD PRESSURE: 155 MMHG | OXYGEN SATURATION: 97 % | BODY MASS INDEX: 25.43 KG/M2 | DIASTOLIC BLOOD PRESSURE: 85 MMHG

## 2020-06-30 PROCEDURE — 93000 ELECTROCARDIOGRAM COMPLETE: CPT

## 2020-06-30 PROCEDURE — 99213 OFFICE O/P EST LOW 20 MIN: CPT

## 2020-06-30 NOTE — HISTORY OF PRESENT ILLNESS
[FreeTextEntry1] : No complaints.  No chest pain.  No shortness of breath. No palpitations.  He checks his pulse daily and notes that his HR is typically in the 70s. His PMD noted elevated BP and tried to increase his Norvasc. This lowered his BP and resulted in fatigue. He believes there is a "white coat" component, and is now back on the old dosing.

## 2020-06-30 NOTE — DISCUSSION/SUMMARY
[FreeTextEntry1] : Doing well post ablation albeit on sotalol.  We discussed that AF early post ablation may be related to inflammation, reconnected vein, or nonpulmonary vein trigger.  With his elevated blood pressure I would not stop the sotalol to resume metoprolol.  That is, I suggest that we continue this medication for now.  He will remain on AC long term. Follow up in 6 months.

## 2020-08-25 NOTE — CARDIOLOGY SUMMARY
[___] : [unfilled] Administered By (Optional): Dr. Venkata Capps MD Concentration Of Solution Injected (Mg/Ml): 10.0 Lot # (Optional): [id number] Include Z78.9 (Other Specified Conditions Influencing Health Status) As An Associated Diagnosis?: No Kenalog Preparation: Kenalog Consent: The risks of atrophy were reviewed with the patient. Detail Level: Simple Expiration Date (Optional): 2/2022 X Size Of Lesion In Cm (Optional): 0 Total Volume Injected (Ccs- Only Use Numbers And Decimals): .6 Total Volume Injected (Ccs- Only Use Numbers And Decimals): .4 Lot # (Optional): [id number] Administered By (Optional): Dr. Venkata Capps MD

## 2020-09-11 ENCOUNTER — TRANSCRIPTION ENCOUNTER (OUTPATIENT)
Age: 75
End: 2020-09-11

## 2020-09-14 ENCOUNTER — TRANSCRIPTION ENCOUNTER (OUTPATIENT)
Age: 75
End: 2020-09-14

## 2020-10-08 ENCOUNTER — TRANSCRIPTION ENCOUNTER (OUTPATIENT)
Age: 75
End: 2020-10-08

## 2020-10-08 RX ORDER — METOPROLOL SUCCINATE 50 MG/1
50 TABLET, EXTENDED RELEASE ORAL
Qty: 30 | Refills: 3 | Status: DISCONTINUED | COMMUNITY
Start: 2020-09-11 | End: 2020-10-08

## 2020-12-29 ENCOUNTER — APPOINTMENT (OUTPATIENT)
Dept: ELECTROPHYSIOLOGY | Facility: CLINIC | Age: 75
End: 2020-12-29
Payer: MEDICARE

## 2020-12-29 ENCOUNTER — NON-APPOINTMENT (OUTPATIENT)
Age: 75
End: 2020-12-29

## 2020-12-29 VITALS
DIASTOLIC BLOOD PRESSURE: 78 MMHG | WEIGHT: 160 LBS | HEART RATE: 75 BPM | OXYGEN SATURATION: 99 % | HEIGHT: 67 IN | BODY MASS INDEX: 25.11 KG/M2 | SYSTOLIC BLOOD PRESSURE: 143 MMHG

## 2020-12-29 PROCEDURE — 93000 ELECTROCARDIOGRAM COMPLETE: CPT

## 2020-12-29 PROCEDURE — 99215 OFFICE O/P EST HI 40 MIN: CPT

## 2020-12-29 PROCEDURE — 99072 ADDL SUPL MATRL&STAF TM PHE: CPT

## 2020-12-29 NOTE — DISCUSSION/SUMMARY
[FreeTextEntry1] : In summary, this is a 75 year old man with a history of HTN, HLD, atrial fibrillation and atrial flutter who underwent a PVI + CTI on 8/28/20. He has been doing well post-ablation. He briefly self-discontinued his Sotalol in September 2020, but noticed his heart rate becoming erratic and elevated and therefore restarted it.\par \par Options regarding management, including continuing Sotalol, stopping Sotalol placing event monitor to confirm atrial fibrillation and considering a repeat ablation were reviewed in a shared decision making fashion. The QTc on today's ECG is acceptable and he feels that he is tolerating the Sotalol without any issue. We will plan to continue his current medications.\par \par Mr. Liu  appeared to understand the whole discussion and verbalized that all of his questions were answered to his satisfaction.

## 2020-12-29 NOTE — HISTORY OF PRESENT ILLNESS
[FreeTextEntry1] : Mr. Liu is a 75 year old man with a history of HTN, HLD, atrial fibrillation and typical atrial flutter. He eventually underwent a catheter ablation (PVI + CTI) on 8/28/2019 and he presents today for follow up. He has been on Sotalol since his ablation. He briefly switched from Sotalol to Metoprolol in September 2020 because he felt it was making him tired. However, he noticed his pulse "becoming erratic" and up to  120 bpm on his pulse-oximeter. He was asymptomatic. His heart rates in atrial fibrillation prior to the ablation were up to 180 bpm. He subsequently switched back to Sotalol within 2-3 days.\par \par In October 2020 he had left knee replacement surgery. Prior to his surgery, he had been taking Naproxen daily for pain. He did not have any bleeding issues with Naproxen + Eliquis. Since his left knee replacement, he switched from Naproxen to Acetaminophen. He is pending a right knee replacement.\par \par Mr. Liu denies any recent history of chest pain, shortness of breath, palpitations, dizziness, or syncope.

## 2021-04-23 ENCOUNTER — NON-APPOINTMENT (OUTPATIENT)
Age: 76
End: 2021-04-23

## 2021-05-19 ENCOUNTER — NON-APPOINTMENT (OUTPATIENT)
Age: 76
End: 2021-05-19

## 2021-05-19 ENCOUNTER — APPOINTMENT (OUTPATIENT)
Dept: OTOLARYNGOLOGY | Facility: CLINIC | Age: 76
End: 2021-05-19
Payer: MEDICARE

## 2021-05-19 VITALS
HEIGHT: 67.5 IN | DIASTOLIC BLOOD PRESSURE: 89 MMHG | TEMPERATURE: 98.2 F | BODY MASS INDEX: 24.82 KG/M2 | WEIGHT: 160 LBS | HEART RATE: 78 BPM | SYSTOLIC BLOOD PRESSURE: 162 MMHG

## 2021-05-19 PROCEDURE — G0268 REMOVAL OF IMPACTED WAX MD: CPT

## 2021-05-19 PROCEDURE — 99072 ADDL SUPL MATRL&STAF TM PHE: CPT

## 2021-05-19 PROCEDURE — 99203 OFFICE O/P NEW LOW 30 MIN: CPT | Mod: 25

## 2021-05-19 PROCEDURE — 92557 COMPREHENSIVE HEARING TEST: CPT

## 2021-05-19 PROCEDURE — 92567 TYMPANOMETRY: CPT

## 2021-05-19 NOTE — DATA REVIEWED
[de-identified] : Hearing Test performed to evaluate the extent of hearing loss and  to explain pt's symptoms\par today's hearing test was personally reviewed and revealed\par bilat SNHL L>R [de-identified] : MRI of IAC's-wnl

## 2021-05-19 NOTE — HISTORY OF PRESENT ILLNESS
[No] : patient does not have a  history of radiation therapy [de-identified] : 74 yo male\par Patient complains of hearing loss x 30 years. States over the years hearing has progressively worsened. Hes had blood work and MRI done which were wnl. He was exposed to loud noises over the years at work. Had constant bilateral tinnitus. Pt has no ear pain, ear drainage, hearing loss, nasal congestion, nasal discharge, epistaxis, sinus infections, facial pain, facial pressure, throat pain, dysphagia or fevers\par \par  [Hearing Loss] : hearing loss [Presbycusis] : presbycusis [Allergic Rhinitis] : no allergic rhinitis [Adenoidectomy] : no adenoidectomy [Allergies] : no allergies [Asthma] : no asthma [Hyperthyroidism] : no hyperthyroidism [Sialadenitis] : no sialadenitis [Hodgkin Disease] : no hodgkin disease [Non-Hodgkin Lymphoma] : no non-hodgkin lymphoma [None] : No risk factors have been identified. [Graves Disease] : no graves disease [Thyroid Cancer] : no thyroid cancer

## 2021-05-19 NOTE — ASSESSMENT
[FreeTextEntry1] : Patient complains of hearing loss x 30 years, over the past years hearing has progressively worsened\par \par Bilateral SNHL:\par -cleared for hearing aids\par -Hearing Test performed to evaluate the extent of hearing loss and to explain pt's symptoms\par \par Wax:\par -Cerumen is removed from the right and left  ear canal with a curette and suction.\par -Rx:Debrox be placed in both ears on a routine basis to keep them free of wax.\par -Routine debridement suggested to keep the ears free of wax.\par

## 2021-05-19 NOTE — END OF VISIT
[FreeTextEntry3] : I personally saw and examined YOLANDA ASCENCIO in detail. I spoke to MARILYN Silvestre regarding the assessment and plan of care. I reviewed the above assessment and plan of care, and agree. I have made changes in changes in the body of the note where appropriate.I personally reviewed the HPI, PMH, FH, SH, ROS and medications/allergies. I have spoken to MARILYN Silvestre regarding the history and have personally determined the assessment and plan of care, and documented this myself. I was present and participated in all key portions of the encounter and all procedures noted above. I have made changes in the body of the note where appropriate.\par \par Attesting Faculty: See Attending Signature Below \par \par \par  [Time Spent: ___ minutes] : I have spent [unfilled] minutes of time on the encounter.

## 2021-06-03 ENCOUNTER — APPOINTMENT (OUTPATIENT)
Dept: OTOLARYNGOLOGY | Facility: CLINIC | Age: 76
End: 2021-06-03
Payer: MEDICARE

## 2021-06-03 VITALS
WEIGHT: 160 LBS | TEMPERATURE: 97.9 F | SYSTOLIC BLOOD PRESSURE: 160 MMHG | BODY MASS INDEX: 24.82 KG/M2 | DIASTOLIC BLOOD PRESSURE: 86 MMHG | HEART RATE: 66 BPM | HEIGHT: 67.5 IN

## 2021-06-03 PROCEDURE — 92557 COMPREHENSIVE HEARING TEST: CPT

## 2021-06-03 PROCEDURE — 99072 ADDL SUPL MATRL&STAF TM PHE: CPT

## 2021-06-03 PROCEDURE — 99213 OFFICE O/P EST LOW 20 MIN: CPT | Mod: 25

## 2021-06-03 PROCEDURE — 92567 TYMPANOMETRY: CPT

## 2021-06-03 NOTE — REASON FOR VISIT
[Hearing Loss] : hearing loss [Spouse] : spouse [Subsequent Evaluation] : a subsequent evaluation for

## 2021-06-04 NOTE — DATA REVIEWED
[de-identified] : Hearing Test performed to evaluate the extent of hearing loss and  to explain pt's symptoms\par today's hearing test was personally reviewed and revealed\par bilat SNHL L>R No Change from previous audio [de-identified] : MRI of IAC's-wnl

## 2021-06-04 NOTE — ASSESSMENT
[FreeTextEntry1] : Patient with hx of hearing loss complains decreased hearing left ear. States he noticed it 1 week after his last visit here. \par \par Bilateral SNHL-stable\par -Hearing Test performed to evaluate the extent of hearing loss and to explain pt's symptoms\par bilateral sensorineural hearing loss-cleared for hearing aids\par Pt reassured\par \par f/u in several months

## 2021-06-04 NOTE — HISTORY OF PRESENT ILLNESS
[No] : patient does not have a  history of radiation therapy [Hearing Loss] : hearing loss [Presbycusis] : presbycusis [None] : No risk factors have been identified. [de-identified] : 74 yo male\par Patient complains of hearing loss x 30 years. States over the years hearing has progressively worsened. Hes had blood work and MRI done which were wnl. He was exposed to loud noises over the years at work. Had constant bilateral tinnitus. Pt has no ear pain, ear drainage, hearing loss, nasal congestion, nasal discharge, epistaxis, sinus infections, facial pain, facial pressure, throat pain, dysphagia or fevers\par \par  [FreeTextEntry1] : 6/3/2021\par Patient complains after last visit hearing has significantly decreased in the left ear. Noticed it one week after his visit here. He has tinnitus in the left ear describes it as a ocean sound and sounds come to him ear as a metallic sound. No other modifying factors\par \par  [Allergic Rhinitis] : no allergic rhinitis [Adenoidectomy] : no adenoidectomy [Allergies] : no allergies [Asthma] : no asthma [Hyperthyroidism] : no hyperthyroidism [Sialadenitis] : no sialadenitis [Hodgkin Disease] : no hodgkin disease [Non-Hodgkin Lymphoma] : no non-hodgkin lymphoma [Graves Disease] : no graves disease [Thyroid Cancer] : no thyroid cancer

## 2021-06-30 ENCOUNTER — APPOINTMENT (OUTPATIENT)
Dept: PHARMACY | Facility: CLINIC | Age: 76
End: 2021-06-30

## 2021-09-02 ENCOUNTER — NON-APPOINTMENT (OUTPATIENT)
Age: 76
End: 2021-09-02

## 2021-09-07 ENCOUNTER — APPOINTMENT (OUTPATIENT)
Dept: ORTHOPEDIC SURGERY | Facility: CLINIC | Age: 76
End: 2021-09-07
Payer: MEDICARE

## 2021-09-07 ENCOUNTER — NON-APPOINTMENT (OUTPATIENT)
Age: 76
End: 2021-09-07

## 2021-09-07 VITALS
WEIGHT: 165 LBS | SYSTOLIC BLOOD PRESSURE: 175 MMHG | DIASTOLIC BLOOD PRESSURE: 94 MMHG | HEART RATE: 77 BPM | BODY MASS INDEX: 25.59 KG/M2 | HEIGHT: 67.5 IN

## 2021-09-07 DIAGNOSIS — M65.332 TRIGGER FINGER, LEFT MIDDLE FINGER: ICD-10-CM

## 2021-09-07 DIAGNOSIS — M18.12 UNILATERAL PRIMARY OSTEOARTHRITIS OF FIRST CARPOMETACARPAL JOINT, LEFT HAND: ICD-10-CM

## 2021-09-07 DIAGNOSIS — M18.11 UNILATERAL PRIMARY OSTEOARTHRITIS OF FIRST CARPOMETACARPAL JOINT, RIGHT HAND: ICD-10-CM

## 2021-09-07 DIAGNOSIS — M19.041 PRIMARY OSTEOARTHRITIS, RIGHT HAND: ICD-10-CM

## 2021-09-07 PROCEDURE — 99203 OFFICE O/P NEW LOW 30 MIN: CPT | Mod: 25

## 2021-09-07 PROCEDURE — 20550 NJX 1 TENDON SHEATH/LIGAMENT: CPT | Mod: F2

## 2021-11-22 ENCOUNTER — APPOINTMENT (OUTPATIENT)
Dept: OTOLARYNGOLOGY | Facility: CLINIC | Age: 76
End: 2021-11-22
Payer: MEDICARE

## 2021-11-22 VITALS
TEMPERATURE: 97.6 F | DIASTOLIC BLOOD PRESSURE: 94 MMHG | HEART RATE: 73 BPM | WEIGHT: 164 LBS | HEIGHT: 67.5 IN | BODY MASS INDEX: 25.44 KG/M2 | SYSTOLIC BLOOD PRESSURE: 178 MMHG

## 2021-11-22 DIAGNOSIS — H93.12 TINNITUS, LEFT EAR: ICD-10-CM

## 2021-11-22 PROCEDURE — 99212 OFFICE O/P EST SF 10 MIN: CPT

## 2021-11-22 NOTE — HISTORY OF PRESENT ILLNESS
[No] : patient does not have a  history of radiation therapy [Hearing Loss] : hearing loss [Presbycusis] : presbycusis [None] : No risk factors have been identified. [de-identified] : Patient complains of hearing loss x 30 years. Has constant bilateral tinnitus. He got hearing aids using it in both ears. Otherwise doing well. No change in hearing. Pt has no ear pain, ear drainage, nasal congestion, nasal discharge, epistaxis, sinus infections, facial pain, facial pressure, throat pain, dysphagia or fevers\par \par  [Allergic Rhinitis] : no allergic rhinitis [Adenoidectomy] : no adenoidectomy [Allergies] : no allergies [Asthma] : no asthma [Hyperthyroidism] : no hyperthyroidism [Sialadenitis] : no sialadenitis [Hodgkin Disease] : no hodgkin disease [Non-Hodgkin Lymphoma] : no non-hodgkin lymphoma [Graves Disease] : no graves disease [Thyroid Cancer] : no thyroid cancer

## 2021-11-22 NOTE — DATA REVIEWED
[de-identified] : Hearing Test performed to evaluate the extent of hearing loss and  to explain pt's symptoms\par today's hearing test was personally reviewed and revealed\par bilat SNHL L>R No Change from previous audio [de-identified] : MRI of IAC's-wnl

## 2021-11-22 NOTE — END OF VISIT
[Time Spent: ___ minutes] : I have spent [unfilled] minutes of time on the encounter. [FreeTextEntry3] : I personally saw and examined YOLANDA ASCENCIO in detail. I spoke to MARILYN Silvestre regarding the assessment and plan of care. I reviewed the above assessment and plan of care, and agree. I have made changes in changes in the body of the note where appropriate.I personally reviewed the HPI, PMH, FH, SH, ROS and medications/allergies. I have spoken to MARILYN Silvestre regarding the history and have personally determined the assessment and plan of care, and documented this myself. I was present and participated in all key portions of the encounter and all procedures noted above. I have made changes in the body of the note where appropriate.\par \par Attesting Faculty: See Attending Signature Below \par \par \par

## 2021-11-22 NOTE — ASSESSMENT
[FreeTextEntry1] : Patient with hx of hearing loss with hearing aids presents for follow up. No change in hearing, otherwise doing well \par \par Bilateral SNHL:\par -cleared for hearing aids\par \par f/u prn

## 2022-01-04 ENCOUNTER — APPOINTMENT (OUTPATIENT)
Dept: ELECTROPHYSIOLOGY | Facility: CLINIC | Age: 77
End: 2022-01-04
Payer: MEDICARE

## 2022-01-04 ENCOUNTER — NON-APPOINTMENT (OUTPATIENT)
Age: 77
End: 2022-01-04

## 2022-01-04 VITALS — SYSTOLIC BLOOD PRESSURE: 160 MMHG | DIASTOLIC BLOOD PRESSURE: 88 MMHG

## 2022-01-04 VITALS
OXYGEN SATURATION: 100 % | HEIGHT: 67.5 IN | BODY MASS INDEX: 25.59 KG/M2 | WEIGHT: 165 LBS | HEART RATE: 64 BPM | DIASTOLIC BLOOD PRESSURE: 93 MMHG | SYSTOLIC BLOOD PRESSURE: 178 MMHG

## 2022-01-04 PROCEDURE — 99215 OFFICE O/P EST HI 40 MIN: CPT

## 2022-01-04 PROCEDURE — 93000 ELECTROCARDIOGRAM COMPLETE: CPT

## 2022-01-04 NOTE — HISTORY OF PRESENT ILLNESS
[FreeTextEntry1] : Mr. Liu is a 76 year old man with a history of HTN, HLD, atrial fibrillation and typical atrial flutter. He eventually underwent a catheter ablation (PVI + CTI) on 8/28/2019 and he presents today for follow up. He has been on Sotalol since his ablation. He briefly switched from Sotalol to Metoprolol in September 2020 because he felt it was making him tired. However, he noticed his pulse "becoming erratic" and up to  120 bpm on his pulse-oximeter. He was asymptomatic. His heart rates in atrial fibrillation prior to the ablation were up to 180 bpm. He subsequently switched back to Sotalol within 2-3 days.  \par \par His HR remains consistent.  No complaints. No chest pain. No shortness of breath. No palpitations. \par

## 2022-01-04 NOTE — DISCUSSION/SUMMARY
[FreeTextEntry1] : 76 year old man with a history of HTN, HLD, atrial fibrillation and atrial flutter who underwent a PVI + CTI on 8/28/20. He has been doing well post-ablation. He briefly self-discontinued his Sotalol in September 2020, but noticed his heart rate becoming erratic and elevated and therefore restarted it.  We have previously discussed options regarding management, including continuing Sotalol, stopping Sotalol placing event monitor to confirm atrial fibrillation and considering a repeat ablation were reviewed in a shared decision making fashion. The QTc on today's ECG is acceptable and he feels that he is tolerating the Sotalol without any issue. Nonetheless he has an interest in stopping the sotalol.  AFter a lengthy discussion we have decided to titrate to 1/2 a pill and see how he feels. \par \par His BP is elevated today, but he explains that at home it is better.  He will follow up with Dr Guzman for BP.

## 2023-01-08 NOTE — PHYSICAL EXAM
[General Appearance - Well Developed] : well developed [Normal Appearance] : normal appearance [Well Groomed] : well groomed [General Appearance - Well Nourished] : well nourished [No Deformities] : no deformities [General Appearance - In No Acute Distress] : no acute distress [Normal Conjunctiva] : the conjunctiva exhibited no abnormalities [Eyelids - No Xanthelasma] : the eyelids demonstrated no xanthelasmas [Normal Oral Mucosa] : normal oral mucosa [No Oral Pallor] : no oral pallor [No Oral Cyanosis] : no oral cyanosis [Normal Jugular Venous A Waves Present] : normal jugular venous A waves present [Normal Jugular Venous V Waves Present] : normal jugular venous V waves present [No Jugular Venous Lopez A Waves] : no jugular venous lopez A waves [Exaggerated Use Of Accessory Muscles For Inspiration] : no accessory muscle use [Respiration, Rhythm And Depth] : normal respiratory rhythm and effort [Auscultation Breath Sounds / Voice Sounds] : lungs were clear to auscultation bilaterally [Heart Rate And Rhythm] : heart rate and rhythm were normal [Heart Sounds] : normal S1 and S2 [Murmurs] : no murmurs present [Abdomen Soft] : soft [Abdomen Tenderness] : non-tender [Abdomen Mass (___ Cm)] : no abdominal mass palpated [Abnormal Walk] : normal gait [Gait - Sufficient For Exercise Testing] : the gait was sufficient for exercise testing [Nail Clubbing] : no clubbing of the fingernails [Cyanosis, Localized] : no localized cyanosis [Petechial Hemorrhages (___cm)] : no petechial hemorrhages [Skin Color & Pigmentation] : normal skin color and pigmentation [] : no rash [No Venous Stasis] : no venous stasis [No Skin Ulcers] : no skin ulcer [Skin Lesions] : no skin lesions [No Xanthoma] : no  xanthoma was observed [Oriented To Time, Place, And Person] : oriented to person, place, and time [Affect] : the affect was normal [Mood] : the mood was normal [No Anxiety] : not feeling anxious

## 2023-01-10 ENCOUNTER — APPOINTMENT (OUTPATIENT)
Dept: ELECTROPHYSIOLOGY | Facility: CLINIC | Age: 78
End: 2023-01-10
Payer: MEDICARE

## 2023-01-10 ENCOUNTER — NON-APPOINTMENT (OUTPATIENT)
Age: 78
End: 2023-01-10

## 2023-01-10 VITALS
SYSTOLIC BLOOD PRESSURE: 133 MMHG | OXYGEN SATURATION: 100 % | HEIGHT: 67.5 IN | HEART RATE: 75 BPM | WEIGHT: 156 LBS | BODY MASS INDEX: 24.2 KG/M2 | DIASTOLIC BLOOD PRESSURE: 78 MMHG

## 2023-01-10 DIAGNOSIS — I48.91 UNSPECIFIED ATRIAL FIBRILLATION: ICD-10-CM

## 2023-01-10 PROCEDURE — 99213 OFFICE O/P EST LOW 20 MIN: CPT | Mod: 25

## 2023-01-10 PROCEDURE — 93000 ELECTROCARDIOGRAM COMPLETE: CPT

## 2023-01-10 RX ORDER — SOTALOL HYDROCHLORIDE 80 MG/1
80 TABLET ORAL
Refills: 0 | Status: ACTIVE | COMMUNITY

## 2023-01-10 RX ORDER — SOTALOL HYDROCHLORIDE 80 MG/1
80 TABLET ORAL
Qty: 180 | Refills: 1 | Status: DISCONTINUED | COMMUNITY
End: 2023-01-10

## 2023-01-10 NOTE — DISCUSSION/SUMMARY
[FreeTextEntry1] : 77 year old man with a history of HTN, HLD, atrial fibrillation and atrial flutter who underwent a PVI + CTI on 8/28/20. He has been doing well post-ablation. He briefly self-discontinued his Sotalol in September 2020, but noticed his heart rate becoming erratic and elevated and therefore restarted it. Since then he has titrated the sotalol to 40 mg BID with no observable/symptomatic breakthrough.  QTc is OK.  He will continue this therapy. He will follow up with Dr Guzman. I will see as needed.

## 2023-01-10 NOTE — HISTORY OF PRESENT ILLNESS
[FreeTextEntry1] : Mr. Liu is a 77 year old man with a history of HTN, HLD, atrial fibrillation and typical atrial flutter. He eventually underwent a catheter ablation (PVI + CTI) on 8/28/2019 and he presents today for follow up. He has been on Sotalol since his ablation. He briefly switched from Sotalol to Metoprolol in September 2020 because he felt it was making him tired. However, he noticed his pulse "becoming erratic" and up to  120 bpm on his pulse-oximeter. He was asymptomatic. His heart rates in atrial fibrillation prior to the ablation were up to 180 bpm. He subsequently switched back to Sotalol within 2-3 days.   For the past 6 months he has lowered the sotalol to 40 mg BID and has no complaints.  he checks his HR every AM and it has been regular and slow.  No lightheadedness/dizziness. \par

## 2023-05-08 ENCOUNTER — APPOINTMENT (OUTPATIENT)
Dept: OTOLARYNGOLOGY | Facility: CLINIC | Age: 78
End: 2023-05-08
Payer: MEDICARE

## 2023-05-08 ENCOUNTER — NON-APPOINTMENT (OUTPATIENT)
Age: 78
End: 2023-05-08

## 2023-05-08 VITALS
WEIGHT: 159 LBS | TEMPERATURE: 98 F | DIASTOLIC BLOOD PRESSURE: 81 MMHG | HEIGHT: 67.5 IN | BODY MASS INDEX: 24.66 KG/M2 | HEART RATE: 72 BPM | SYSTOLIC BLOOD PRESSURE: 149 MMHG

## 2023-05-08 PROCEDURE — G0268 REMOVAL OF IMPACTED WAX MD: CPT

## 2023-05-08 PROCEDURE — 99213 OFFICE O/P EST LOW 20 MIN: CPT | Mod: 25

## 2023-05-08 PROCEDURE — 92567 TYMPANOMETRY: CPT

## 2023-05-08 PROCEDURE — 92557 COMPREHENSIVE HEARING TEST: CPT

## 2023-05-08 NOTE — DATA REVIEWED
[de-identified] : Hearing Test performed to evaluate the extent of hearing loss and  to explain pt's symptoms\par today's hearing test was personally reviewed and revealed\par Tymps-wnl\par bcat SNHL [de-identified] : MRI-IAC's-no masses

## 2023-05-08 NOTE — HISTORY OF PRESENT ILLNESS
[de-identified] : 78 yo male\par PAtient with hx of hearing loss with hearing aids referred by audiologist for change in most recent hearing test. SHe said it may be due to cerumen impaction. He doesn’t notice much change in his hearing. He is very happy with his hearing aids, states he is hearing well with them. \par no other modifying factors\par no nasal or throat complaints\par  [Hearing Loss] : hearing loss [Nasal Congestion] : no nasal congestion [Ear Fullness] : ear fullness [Neck Mass] : no neck mass [Cough] : no cough

## 2023-05-08 NOTE — ASSESSMENT
[FreeTextEntry1] : Mr. ASCENCIO 77 year M w/ hx of Asymmetrical  SNHL with hearing aids referred by audiologist her change in hearing. He does not notice a change in hearing\par \par Wax\par -Cerumen is removed from the right and left  ear canal with a curette and suction.\par -Routine debridement suggested to keep the ears free of wax.\par \par Asymmetrical SNHL:\par -Previous  MRI normal \par -Hearing Test performed to evaluate the extent of hearing loss and to explain pt's symptoms\par bilateral sensorineural hearing loss-cleared for hearing aids\par \par  \par \par f/u 1 year or prn

## 2024-06-03 ENCOUNTER — APPOINTMENT (OUTPATIENT)
Dept: OTOLARYNGOLOGY | Facility: CLINIC | Age: 79
End: 2024-06-03
Payer: MEDICARE

## 2024-06-03 VITALS — HEART RATE: 74 BPM | DIASTOLIC BLOOD PRESSURE: 69 MMHG | SYSTOLIC BLOOD PRESSURE: 116 MMHG | TEMPERATURE: 97.6 F

## 2024-06-03 VITALS — WEIGHT: 161 LBS | BODY MASS INDEX: 24.84 KG/M2

## 2024-06-03 DIAGNOSIS — H90.3 SENSORINEURAL HEARING LOSS, BILATERAL: ICD-10-CM

## 2024-06-03 DIAGNOSIS — H61.23 IMPACTED CERUMEN, BILATERAL: ICD-10-CM

## 2024-06-03 PROCEDURE — 99213 OFFICE O/P EST LOW 20 MIN: CPT | Mod: 25

## 2024-06-03 PROCEDURE — 69210 REMOVE IMPACTED EAR WAX UNI: CPT

## 2024-06-03 NOTE — PHYSICAL EXAM
[Normal] : mucosa is normal [Midline] : trachea located in midline position [de-identified] : b/l kristyn

## 2024-06-03 NOTE — ASSESSMENT
[FreeTextEntry1] : Hx of asymmetric SNHL with neg MRI: - Continue with HA use - Continue to f/u with your audiologist for HA adjustments. - F/U annually   Cerumen: - Cerumen removed b/l - f/u annually

## 2024-06-03 NOTE — END OF VISIT
[FreeTextEntry3] : I personally saw and examined YOLANDA ASCENCIO in detail.I have made changes in changes in the body of the note where appropriate. I personally reviewed the HPI, PMH, FH, SH, ROS and medications/allergies. I have spoken to MARILYN Burciaga regarding the history and have personally determined the assessment and plan of care and documented this myself.. I performed all procedures and performed the physical exam. I have made changes in the body of the note where appropriate.   Attesting Faculty: See Attending Signature Below

## 2024-06-03 NOTE — PROCEDURE
[FreeTextEntry3] : Procedure - Cerumen Removal. After informed verbal consent is obtained, cerumen was removed from the b/l ear canal(s) with a curette and suction.  Normal appearing canal(s) following removal.

## 2024-06-03 NOTE — HISTORY OF PRESENT ILLNESS
[de-identified] : 79 y/o M with hx of b/l SNHL and left asymmetry.  Uses HA and at his last audiogram with his audiologist and noted to have cerumen impaction.  No pain, no vertigo.

## 2025-03-04 ENCOUNTER — APPOINTMENT (OUTPATIENT)
Dept: ELECTROPHYSIOLOGY | Facility: CLINIC | Age: 80
End: 2025-03-04
Payer: MEDICARE

## 2025-03-04 ENCOUNTER — NON-APPOINTMENT (OUTPATIENT)
Age: 80
End: 2025-03-04

## 2025-03-04 VITALS
WEIGHT: 163 LBS | SYSTOLIC BLOOD PRESSURE: 175 MMHG | OXYGEN SATURATION: 99 % | DIASTOLIC BLOOD PRESSURE: 101 MMHG | HEART RATE: 69 BPM | BODY MASS INDEX: 25.15 KG/M2

## 2025-03-04 DIAGNOSIS — I48.91 UNSPECIFIED ATRIAL FIBRILLATION: ICD-10-CM

## 2025-03-04 PROCEDURE — 99213 OFFICE O/P EST LOW 20 MIN: CPT | Mod: 25

## 2025-03-04 PROCEDURE — 93000 ELECTROCARDIOGRAM COMPLETE: CPT

## 2025-05-05 ENCOUNTER — APPOINTMENT (OUTPATIENT)
Dept: OTOLARYNGOLOGY | Facility: CLINIC | Age: 80
End: 2025-05-05
Payer: MEDICARE

## 2025-05-05 DIAGNOSIS — H61.23 IMPACTED CERUMEN, BILATERAL: ICD-10-CM

## 2025-05-05 DIAGNOSIS — H90.3 SENSORINEURAL HEARING LOSS, BILATERAL: ICD-10-CM

## 2025-05-05 PROCEDURE — 69210 REMOVE IMPACTED EAR WAX UNI: CPT

## 2025-05-05 PROCEDURE — 99213 OFFICE O/P EST LOW 20 MIN: CPT | Mod: 25

## 2025-06-16 ENCOUNTER — OUTPATIENT (OUTPATIENT)
Dept: OUTPATIENT SERVICES | Facility: HOSPITAL | Age: 80
LOS: 1 days | End: 2025-06-16
Payer: MEDICARE

## 2025-06-16 VITALS
WEIGHT: 160.06 LBS | TEMPERATURE: 98 F | SYSTOLIC BLOOD PRESSURE: 155 MMHG | RESPIRATION RATE: 16 BRPM | OXYGEN SATURATION: 98 % | DIASTOLIC BLOOD PRESSURE: 78 MMHG | HEART RATE: 62 BPM | HEIGHT: 65 IN

## 2025-06-16 DIAGNOSIS — Z01.818 ENCOUNTER FOR OTHER PREPROCEDURAL EXAMINATION: ICD-10-CM

## 2025-06-16 DIAGNOSIS — I48.0 PAROXYSMAL ATRIAL FIBRILLATION: ICD-10-CM

## 2025-06-16 DIAGNOSIS — Z96.653 PRESENCE OF ARTIFICIAL KNEE JOINT, BILATERAL: Chronic | ICD-10-CM

## 2025-06-16 DIAGNOSIS — S83.289A OTHER TEAR OF LATERAL MENISCUS, CURRENT INJURY, UNSPECIFIED KNEE, INITIAL ENCOUNTER: Chronic | ICD-10-CM

## 2025-06-16 DIAGNOSIS — I48.91 UNSPECIFIED ATRIAL FIBRILLATION: ICD-10-CM

## 2025-06-16 LAB
ANION GAP SERPL CALC-SCNC: 14 MMOL/L — SIGNIFICANT CHANGE UP (ref 5–17)
BLD GP AB SCN SERPL QL: NEGATIVE — SIGNIFICANT CHANGE UP
BUN SERPL-MCNC: 21 MG/DL — SIGNIFICANT CHANGE UP (ref 7–23)
CALCIUM SERPL-MCNC: 10 MG/DL — SIGNIFICANT CHANGE UP (ref 8.4–10.5)
CHLORIDE SERPL-SCNC: 99 MMOL/L — SIGNIFICANT CHANGE UP (ref 96–108)
CO2 SERPL-SCNC: 25 MMOL/L — SIGNIFICANT CHANGE UP (ref 22–31)
CREAT SERPL-MCNC: 1 MG/DL — SIGNIFICANT CHANGE UP (ref 0.5–1.3)
EGFR: 77 ML/MIN/1.73M2 — SIGNIFICANT CHANGE UP
EGFR: 77 ML/MIN/1.73M2 — SIGNIFICANT CHANGE UP
GLUCOSE SERPL-MCNC: 98 MG/DL — SIGNIFICANT CHANGE UP (ref 70–99)
HCT VFR BLD CALC: 38.4 % — LOW (ref 39–50)
HGB BLD-MCNC: 13.1 G/DL — SIGNIFICANT CHANGE UP (ref 13–17)
MCHC RBC-ENTMCNC: 32.3 PG — SIGNIFICANT CHANGE UP (ref 27–34)
MCHC RBC-ENTMCNC: 34.1 G/DL — SIGNIFICANT CHANGE UP (ref 32–36)
MCV RBC AUTO: 94.6 FL — SIGNIFICANT CHANGE UP (ref 80–100)
NRBC BLD AUTO-RTO: 0 /100 WBCS — SIGNIFICANT CHANGE UP (ref 0–0)
PLATELET # BLD AUTO: 216 K/UL — SIGNIFICANT CHANGE UP (ref 150–400)
POTASSIUM SERPL-MCNC: 4.7 MMOL/L — SIGNIFICANT CHANGE UP (ref 3.5–5.3)
POTASSIUM SERPL-SCNC: 4.7 MMOL/L — SIGNIFICANT CHANGE UP (ref 3.5–5.3)
RBC # BLD: 4.06 M/UL — LOW (ref 4.2–5.8)
RBC # FLD: 14.2 % — SIGNIFICANT CHANGE UP (ref 10.3–14.5)
RH IG SCN BLD-IMP: POSITIVE — SIGNIFICANT CHANGE UP
SODIUM SERPL-SCNC: 138 MMOL/L — SIGNIFICANT CHANGE UP (ref 135–145)
WBC # BLD: 5.53 K/UL — SIGNIFICANT CHANGE UP (ref 3.8–10.5)
WBC # FLD AUTO: 5.53 K/UL — SIGNIFICANT CHANGE UP (ref 3.8–10.5)

## 2025-06-16 PROCEDURE — G0463: CPT

## 2025-06-16 PROCEDURE — 86850 RBC ANTIBODY SCREEN: CPT

## 2025-06-16 PROCEDURE — 86900 BLOOD TYPING SEROLOGIC ABO: CPT

## 2025-06-16 PROCEDURE — 86901 BLOOD TYPING SEROLOGIC RH(D): CPT

## 2025-06-16 PROCEDURE — 80048 BASIC METABOLIC PNL TOTAL CA: CPT

## 2025-06-16 PROCEDURE — 85027 COMPLETE CBC AUTOMATED: CPT

## 2025-06-16 NOTE — H&P PST ADULT - TEMPERATURE IN FAHRENHEIT (DEGREES F)
After Visit Summary   5/17/2017    Vidhya Kevin    MRN: [id number]           Visit Information        Provider Department Dept Phone    5/17/2017  4:00 PM Mayuri Yoo MD [facility] [phone]      Allergies as of 5/17/2017  [address]  [id number], [address] [facility]    It is the patient's responsibility to check with and follow their insurance company's guidelines for prior authorization for this test.  You may be held resp 98.2

## 2025-06-16 NOTE — H&P PST ADULT - HISTORY OF PRESENT ILLNESS
78 y/o M with h/o Former smoker, HTN, HLD and A-Fib s/p PVI + CTI on 8/2019 c/o recurrent arrhythmia found on work-up for syncope x2 (last 12/2024). Today he presents to PST for scheduled A-Fib Ablation on 6/30/25. Denies any CP, SOB, N/V, fever or chills.

## 2025-06-16 NOTE — H&P PST ADULT - ENMT
COVID testing was preformed at today's visit. You can expect a call with your results in the next 3-5 days. You should continue to quarantine until your results are communicated to you and/or until you are without fever for over 72 hours. Return to the ED for any worsening of symptoms.    Your Diagnosis is: Viral syndrome    Return to the Emergency department for Fever, chest pain, difficulty breathing ,or for any other issues or concerns..    Your Diagnosis is: Viral syndrome    Return to the Emergency department for Chest pain, shortness of breath ,or for any other issues or concerns.    Your new prescriptions are: Medication pack given    Procedures done today: Chest X-ray, lab testing, EKG. IV fluids and medications given.    Additional instructions: Follow up with your doctor. Drink plenty of fluids.        mouth negative

## 2025-06-16 NOTE — H&P PST ADULT - NSICDXPASTMEDICALHX_GEN_ALL_CORE_FT
PAST MEDICAL HISTORY:  Atrial fibrillation and flutter     False Pass (hard of hearing)     Hypertension     Uses hearing aid

## 2025-06-25 ENCOUNTER — NON-APPOINTMENT (OUTPATIENT)
Age: 80
End: 2025-06-25

## 2025-06-30 ENCOUNTER — TRANSCRIPTION ENCOUNTER (OUTPATIENT)
Age: 80
End: 2025-06-30

## 2025-06-30 ENCOUNTER — OUTPATIENT (OUTPATIENT)
Dept: INPATIENT UNIT | Facility: HOSPITAL | Age: 80
LOS: 1 days | End: 2025-06-30
Payer: MEDICARE

## 2025-06-30 VITALS
SYSTOLIC BLOOD PRESSURE: 136 MMHG | DIASTOLIC BLOOD PRESSURE: 67 MMHG | OXYGEN SATURATION: 98 % | RESPIRATION RATE: 18 BRPM | HEART RATE: 60 BPM

## 2025-06-30 VITALS
TEMPERATURE: 97 F | HEART RATE: 67 BPM | SYSTOLIC BLOOD PRESSURE: 182 MMHG | HEIGHT: 66 IN | WEIGHT: 315 LBS | RESPIRATION RATE: 18 BRPM | OXYGEN SATURATION: 100 % | DIASTOLIC BLOOD PRESSURE: 103 MMHG

## 2025-06-30 DIAGNOSIS — Z96.653 PRESENCE OF ARTIFICIAL KNEE JOINT, BILATERAL: Chronic | ICD-10-CM

## 2025-06-30 DIAGNOSIS — Z01.818 ENCOUNTER FOR OTHER PREPROCEDURAL EXAMINATION: ICD-10-CM

## 2025-06-30 DIAGNOSIS — I48.0 PAROXYSMAL ATRIAL FIBRILLATION: ICD-10-CM

## 2025-06-30 DIAGNOSIS — S83.289A OTHER TEAR OF LATERAL MENISCUS, CURRENT INJURY, UNSPECIFIED KNEE, INITIAL ENCOUNTER: Chronic | ICD-10-CM

## 2025-06-30 PROCEDURE — C1769: CPT

## 2025-06-30 PROCEDURE — 93656 COMPRE EP EVAL ABLTJ ATR FIB: CPT

## 2025-06-30 PROCEDURE — 93005 ELECTROCARDIOGRAM TRACING: CPT

## 2025-06-30 PROCEDURE — C1894: CPT

## 2025-06-30 PROCEDURE — C1766: CPT

## 2025-06-30 PROCEDURE — C1733: CPT

## 2025-06-30 PROCEDURE — C1887: CPT

## 2025-06-30 PROCEDURE — C1889: CPT

## 2025-06-30 PROCEDURE — C1732: CPT

## 2025-06-30 PROCEDURE — C1730: CPT

## 2025-06-30 PROCEDURE — C1759: CPT

## 2025-06-30 PROCEDURE — C9399: CPT

## 2025-06-30 PROCEDURE — 93010 ELECTROCARDIOGRAM REPORT: CPT

## 2025-06-30 PROCEDURE — 93657 TX L/R ATRIAL FIB ADDL: CPT

## 2025-06-30 RX ORDER — LATANOPROST PF 0.05 MG/ML
1 SOLUTION/ DROPS OPHTHALMIC
Refills: 0 | DISCHARGE

## 2025-06-30 RX ORDER — APIXABAN 2.5 MG/1
5 TABLET, FILM COATED ORAL
Refills: 0 | Status: DISCONTINUED | OUTPATIENT
Start: 2025-06-30 | End: 2025-07-14

## 2025-06-30 RX ORDER — SILDENAFIL 50 MG/1
1 TABLET, FILM COATED ORAL
Refills: 0 | DISCHARGE

## 2025-06-30 RX ORDER — SOTALOL HYDROCHLORIDE 120 MG/1
0.5 TABLET ORAL
Qty: 0 | Refills: 0 | DISCHARGE

## 2025-06-30 RX ADMIN — Medication 1 LOZENGE: at 11:55

## 2025-06-30 RX ADMIN — APIXABAN 5 MILLIGRAM(S): 2.5 TABLET, FILM COATED ORAL at 17:04

## 2025-06-30 NOTE — PRE-ANESTHESIA EVALUATION ADULT - NSANTHPMHFT_GEN_ALL_CORE
80 y/o M with h/o Former smoker, HTN, HLD and A-Fib s/p PVI + CTI on 8/2019 c/o recurrent arrhythmia found on work-up for syncope x2 (last 12/2024). for scheduled A-Fib Ablation on 6/30/25. Denies any CP, SOB, N/V, fever or chills.

## 2025-06-30 NOTE — CHART NOTE - NSCHARTNOTEFT_GEN_A_CORE
as per Dr Davidson- restart sotalol 40mg BID post procedure. Patient can start taking this tomorrow morning   Patient has sotalol supply at home
right femoral suture removed   5 minutes of manual compression held with + hemostasis  Activity restrictions explained to patient and his spouse with + understanding
Patient presents for re-do AF ablation with Dr. Davidson. in NAD. VSS  Reports he feels well without CP, SOB, palpitation  Has been compliant with meds and last dose of Eliquis and Sotalol this morning    Meds and pharmacy verified and updated  POA 8/19/25 @ 1130   Plan to proceed with Redo AF ablation with Dr. Davidson

## 2025-06-30 NOTE — ASU DISCHARGE PLAN (ADULT/PEDIATRIC) - PROVIDER TOKENS
PROVIDER:[TOKEN:[[id number]],SCHEDULEDAPPT:[08/19/2025],SCHEDULEDAPPTTIME:[11:30 AM],ESTABLISHEDPATIENT:[T]]

## 2025-06-30 NOTE — ASU DISCHARGE PLAN (ADULT/PEDIATRIC) - CARE PROVIDER_API CALL
Jacob Davidson.  Clinical Cardiac Electrophysiology  [address] [place] [id number]  Phone: [phone]  Fax: [phone]  Established Patient  Scheduled Appointment: 08/19/2025 11:30 AM

## 2025-06-30 NOTE — PRE-OP CHECKLIST - BMI (KG/M2)
Follow up in 3 months  I will send you a letter in the mail with your lab results      Preventive Health Recommendations    Female Ages 65 +    Yearly exam:     See your health care provider every year in order to  o Review health changes.   o Discuss preventive care.    o Review your medicines if your doctor has prescribed any.      You no longer need a yearly Pap test unless you've had an abnormal Pap test in the past 10 years. If you have vaginal symptoms, such as bleeding or discharge, be sure to talk with your provider about a Pap test.      Every 1 to 2 years, have a mammogram.  If you are over 69, talk with your health care provider about whether or not you want to continue having screening mammograms.      Every 10 years, have a colonoscopy. Or, have a yearly FIT test (stool test). These exams will check for colon cancer.       Have a cholesterol test every 5 years, or more often if your doctor advises it.       Have a diabetes test (fasting glucose) every three years. If you are at risk for diabetes, you should have this test more often.       At age 65, have a bone density scan (DEXA) to check for osteoporosis (brittle bone disease).    Shots:    Get a flu shot each year.    Get a tetanus shot every 10 years.    Talk to your doctor about your pneumonia vaccines. There are now two you should receive - Pneumovax (PPSV 23) and Prevnar (PCV 13).    Talk to your doctor about the shingles vaccine.    Talk to your doctor about the hepatitis B vaccine.    Nutrition:     Eat at least 5 servings of fruits and vegetables each day.      Eat whole-grain bread, whole-wheat pasta and brown rice instead of white grains and rice.      Talk to your provider about Calcium and Vitamin D.     Lifestyle    Exercise at least 150 minutes a week (30 minutes a day, 5 days a week). This will help you control your weight and prevent disease.      Limit alcohol to one drink per day.      No smoking.       Wear sunscreen to prevent  skin cancer.       See your dentist twice a year for an exam and cleaning.      See your eye doctor every 1 to 2 years to screen for conditions such as glaucoma, macular degeneration and cataracts.   56.6

## 2025-06-30 NOTE — ASU DISCHARGE PLAN (ADULT/PEDIATRIC) - NS MD DC FALL RISK RISK
For information on Fall & Injury Prevention, visit: [email or web address] OR  [email or web address] OR  https://www.cdc.gov/steadi/patient.html

## 2025-06-30 NOTE — ASU DISCHARGE PLAN (ADULT/PEDIATRIC) - FINANCIAL ASSISTANCE
[facility] provides services at a reduced cost to those who are determined to be eligible through [facility]’s financial assistance program. Information regarding [facility]’s financial assistance program can be found by going to [email or web address] or by calling [phone].

## 2025-06-30 NOTE — PATIENT PROFILE ADULT - FALL HARM RISK - UNIVERSAL INTERVENTIONS
Bed in lowest position, wheels locked, appropriate side rails in place/Call bell, personal items and telephone in reach/Instruct patient to call for assistance before getting out of bed or chair/Non-slip footwear when patient is out of bed/[place] to call system/Physically safe environment - no spills, clutter or unnecessary equipment/Purposeful Proactive Rounding/Room/bathroom lighting operational, light cord in reach

## 2025-08-19 ENCOUNTER — APPOINTMENT (OUTPATIENT)
Dept: ELECTROPHYSIOLOGY | Facility: CLINIC | Age: 80
End: 2025-08-19
Payer: MEDICARE

## 2025-08-19 VITALS — OXYGEN SATURATION: 100 % | SYSTOLIC BLOOD PRESSURE: 160 MMHG | HEART RATE: 73 BPM | DIASTOLIC BLOOD PRESSURE: 91 MMHG

## 2025-08-19 DIAGNOSIS — R06.02 SHORTNESS OF BREATH: ICD-10-CM

## 2025-08-19 DIAGNOSIS — I48.91 UNSPECIFIED ATRIAL FIBRILLATION: ICD-10-CM

## 2025-08-19 PROBLEM — H91.90 UNSPECIFIED HEARING LOSS, UNSPECIFIED EAR: Chronic | Status: ACTIVE | Noted: 2025-06-16

## 2025-08-19 PROBLEM — Z97.4 PRESENCE OF EXTERNAL HEARING-AID: Chronic | Status: ACTIVE | Noted: 2025-06-16

## 2025-08-19 PROCEDURE — 93000 ELECTROCARDIOGRAM COMPLETE: CPT

## 2025-08-19 PROCEDURE — 99215 OFFICE O/P EST HI 40 MIN: CPT | Mod: 25

## 2025-08-22 ENCOUNTER — APPOINTMENT (OUTPATIENT)
Dept: CARDIOLOGY | Facility: CLINIC | Age: 80
End: 2025-08-22
Payer: MEDICARE

## 2025-08-22 PROCEDURE — 93306 TTE W/DOPPLER COMPLETE: CPT

## 2025-08-25 ENCOUNTER — RESULT REVIEW (OUTPATIENT)
Age: 80
End: 2025-08-25

## 2025-08-25 ENCOUNTER — APPOINTMENT (OUTPATIENT)
Dept: CV DIAGNOSTICS | Facility: HOSPITAL | Age: 80
End: 2025-08-25

## 2025-08-25 ENCOUNTER — OUTPATIENT (OUTPATIENT)
Dept: OUTPATIENT SERVICES | Facility: HOSPITAL | Age: 80
LOS: 1 days | End: 2025-08-25
Payer: MEDICARE

## 2025-08-25 DIAGNOSIS — Z96.653 PRESENCE OF ARTIFICIAL KNEE JOINT, BILATERAL: Chronic | ICD-10-CM

## 2025-08-25 DIAGNOSIS — S83.289A OTHER TEAR OF LATERAL MENISCUS, CURRENT INJURY, UNSPECIFIED KNEE, INITIAL ENCOUNTER: Chronic | ICD-10-CM

## 2025-08-25 DIAGNOSIS — R06.02 SHORTNESS OF BREATH: ICD-10-CM

## 2025-08-25 PROCEDURE — 93018 CV STRESS TEST I&R ONLY: CPT

## 2025-08-25 PROCEDURE — 93016 CV STRESS TEST SUPVJ ONLY: CPT

## 2025-09-02 ENCOUNTER — LABORATORY RESULT (OUTPATIENT)
Age: 80
End: 2025-09-02

## 2025-09-02 ENCOUNTER — APPOINTMENT (OUTPATIENT)
Dept: CARDIOLOGY | Facility: CLINIC | Age: 80
End: 2025-09-02
Payer: MEDICARE

## 2025-09-02 VITALS
OXYGEN SATURATION: 97 % | HEIGHT: 67.5 IN | WEIGHT: 158 LBS | DIASTOLIC BLOOD PRESSURE: 83 MMHG | SYSTOLIC BLOOD PRESSURE: 138 MMHG | RESPIRATION RATE: 16 BRPM | TEMPERATURE: 97.8 F | BODY MASS INDEX: 24.51 KG/M2 | HEART RATE: 90 BPM

## 2025-09-02 DIAGNOSIS — Z86.79 OTHER SPECIFIED POSTPROCEDURAL STATES: ICD-10-CM

## 2025-09-02 DIAGNOSIS — I10 ESSENTIAL (PRIMARY) HYPERTENSION: ICD-10-CM

## 2025-09-02 DIAGNOSIS — R06.09 OTHER FORMS OF DYSPNEA: ICD-10-CM

## 2025-09-02 DIAGNOSIS — I48.91 UNSPECIFIED ATRIAL FIBRILLATION: ICD-10-CM

## 2025-09-02 DIAGNOSIS — Z98.890 OTHER SPECIFIED POSTPROCEDURAL STATES: ICD-10-CM

## 2025-09-02 DIAGNOSIS — E78.5 HYPERLIPIDEMIA, UNSPECIFIED: ICD-10-CM

## 2025-09-02 PROCEDURE — G2211 COMPLEX E/M VISIT ADD ON: CPT

## 2025-09-02 PROCEDURE — 99204 OFFICE O/P NEW MOD 45 MIN: CPT

## 2025-09-12 ENCOUNTER — APPOINTMENT (OUTPATIENT)
Dept: CT IMAGING | Facility: CLINIC | Age: 80
End: 2025-09-12